# Patient Record
Sex: FEMALE | Race: BLACK OR AFRICAN AMERICAN | NOT HISPANIC OR LATINO | Employment: UNEMPLOYED | URBAN - METROPOLITAN AREA
[De-identification: names, ages, dates, MRNs, and addresses within clinical notes are randomized per-mention and may not be internally consistent; named-entity substitution may affect disease eponyms.]

---

## 2017-04-03 ENCOUNTER — NON-PROVIDER VISIT (OUTPATIENT)
Dept: OBGYN | Facility: CLINIC | Age: 32
End: 2017-04-03
Payer: MEDICAID

## 2017-04-03 DIAGNOSIS — Z32.01 PREGNANCY EXAMINATION OR TEST, POSITIVE RESULT: ICD-10-CM

## 2017-04-03 LAB
INT CON NEG: NEGATIVE
INT CON POS: POSITIVE
POC URINE PREGNANCY TEST: POSITIVE

## 2017-04-03 PROCEDURE — 81025 URINE PREGNANCY TEST: CPT | Performed by: OBSTETRICS & GYNECOLOGY

## 2017-05-30 ENCOUNTER — INITIAL PRENATAL (OUTPATIENT)
Dept: OBGYN | Facility: CLINIC | Age: 32
End: 2017-05-30
Payer: MEDICAID

## 2017-05-30 ENCOUNTER — HOSPITAL ENCOUNTER (OUTPATIENT)
Facility: MEDICAL CENTER | Age: 32
End: 2017-05-30
Attending: NURSE PRACTITIONER
Payer: MEDICAID

## 2017-05-30 VITALS
SYSTOLIC BLOOD PRESSURE: 118 MMHG | HEIGHT: 67 IN | BODY MASS INDEX: 43.63 KG/M2 | WEIGHT: 278 LBS | DIASTOLIC BLOOD PRESSURE: 62 MMHG

## 2017-05-30 DIAGNOSIS — Z34.82 ENCOUNTER FOR SUPERVISION OF OTHER NORMAL PREGNANCY IN SECOND TRIMESTER: ICD-10-CM

## 2017-05-30 DIAGNOSIS — Z34.82 ENCOUNTER FOR SUPERVISION OF OTHER NORMAL PREGNANCY, SECOND TRIMESTER: ICD-10-CM

## 2017-05-30 DIAGNOSIS — Z34.82 ENCOUNTER FOR SUPERVISION OF OTHER NORMAL PREGNANCY IN SECOND TRIMESTER: Primary | ICD-10-CM

## 2017-05-30 LAB
APPEARANCE UR: NORMAL
BILIRUB UR STRIP-MCNC: NORMAL MG/DL
COLOR UR AUTO: NORMAL
GLUCOSE UR STRIP.AUTO-MCNC: NEGATIVE MG/DL
KETONES UR STRIP.AUTO-MCNC: NEGATIVE MG/DL
LEUKOCYTE ESTERASE UR QL STRIP.AUTO: NEGATIVE
NITRITE UR QL STRIP.AUTO: NEGATIVE
PH UR STRIP.AUTO: 7.5 [PH] (ref 5–8)
PROT UR QL STRIP: NORMAL MG/DL
RBC UR QL AUTO: NEGATIVE
SP GR UR STRIP.AUTO: 1.01
UROBILINOGEN UR STRIP-MCNC: NORMAL MG/DL

## 2017-05-30 PROCEDURE — 81002 URINALYSIS NONAUTO W/O SCOPE: CPT | Performed by: NURSE PRACTITIONER

## 2017-05-30 PROCEDURE — 87591 N.GONORRHOEAE DNA AMP PROB: CPT

## 2017-05-30 PROCEDURE — 87491 CHLMYD TRACH DNA AMP PROBE: CPT

## 2017-05-30 PROCEDURE — 59401 PR NEW OB VISIT: CPT | Performed by: NURSE PRACTITIONER

## 2017-05-30 NOTE — PATIENT INSTRUCTIONS
P:  1.  GC/CT done. Pap deferred as wnl in last 2yrs.         2.  Prenatal labs, including AFP ordered - lab slip given        3.  Discussed PNV, diet, and adequate water intake        4.  NOB packet given        5.  Return to office in 4 wks        6.  Complete OB US in 3 wks

## 2017-05-30 NOTE — PROGRESS NOTES
"S:  Say Merrill is a 32 y.o.   @ EGA: 17w4d ANANT: Estimated Date of Delivery: 11/3/17  per [facility] who presents for her new OB exam.  She has no complaints.  Desires AFP.  Declines CF.  Reports faint FM.  Denies VB, LOF, or cramping.  Denies dysuria, vaginal DC.  Pt is  and lives with  and children. Not presently working outside the home.  Pregnancy is unplanned but wanted.  Offered Centering Pregnancy, pt declines.    O:    Filed Vitals:    17 1035   BP: 118/62   Height: 1.702 m (5' 7\")   Weight: 126.1 kg (278 lb)    See H&P Prenatal Physical.  Wet mount: deferred        FHTs: 142        Fundal ht: 17     A:   1.  IUP @ 17w4d ANANT: Estimated Date of Delivery: 11/3/17 per [facility]         2.  S=D        3.    Patient Active Problem List    Diagnosis Date Noted   • Encounter for supervision of other normal pregnancy, second trimester 2017         P:  1.  GC/CT done. Pap deferred as wnl in last 2yrs.         2.  Prenatal labs, including AFP ordered - lab slip given        3.  Discussed PNV, diet, and adequate water intake        4.  NOB packet given        5.  Return to office in 4 wks        6.  Complete OB US in 3 wks    "

## 2017-05-30 NOTE — MR AVS SNAPSHOT
"        Say Merrill   2017 10:30 AM   Initial Prenatal   MRN: [id number]    [facility]   Dept Phone:  [phone]    Description:  Female : 1985   Provider:  Di Austin C.N.M.; [facility] INTAKE           Allergies as of 2017     No Known Allergies      You were diagnosed with     Encounter for supervision of other normal pregnancy in second trimester   [[id number]]  -  Primary     Encounter for supervision of other normal pregnancy, second trimester   [[id number]]         Vital Signs     Blood Pressure Height Weight Body Mass Index Last Menstrual Period Breastfeeding?    118/62 mmHg 1.702 m (5' 7\") 126.1 kg (278 lb) 43.53 kg/m2 2017 (Exact Date) Unknown    Smoking Status                   Never Smoker            Basic Information     Date Of Birth Sex Race Ethnicity Preferred Language    1985 Female Black or  Non- English      Your appointments     2017 11:15 AM   OB Follow Up with Di Austin C.N.M.   [facility] [address] [address]   [phone]              Problem List              ICD-10-CM Priority Class Noted - Resolved    Encounter for supervision of other normal pregnancy, second trimester Z34.82   2017 - Present      Health Maintenance        Date Due Completion Dates    PAP SMEAR 2018, 2011    IMM DTaP/Tdap/Td Vaccine (2 - Td) 2025            Results     POCT Urinalysis      Component Value Standard Range & Units    POC Color  Negative    POC Appearance  Negative    POC Leukocyte Esterase negative Negative    POC Nitrites negative Negative    POC Urobiligen  Negative (0.2) mg/dL    POC Protein trace Negative mg/dL    POC Urine PH 7.5 5.0 - 8.0    POC Blood negative Negative    POC Specific Gravity 1.010 <1.005 - >1.030    POC Ketones negative Negative mg/dL    POC Biliruben  Negative mg/dL    POC Glucose negative Negative mg/dL         "                  Current Immunizations     Influenza Vaccine Quad Inj (Preserved) 12/24/2015 12:53 PM    Tdap Vaccine 12/24/2015 12:52 PM      Below and/or attached are the medications your provider expects you to take. Review all of your home medications and newly ordered medications with your provider and/or pharmacist. Follow medication instructions as directed by your provider and/or pharmacist. Please keep your medication list with you and share with your provider. Update the information when medications are discontinued, doses are changed, or new medications (including over-the-counter products) are added; and carry medication information at all times in the event of emergency situations     Allergies:  No Known Allergies          Medications  Valid as of: May 30, 2017 - 11:04 AM    Generic Name Brand Name Tablet Size Instructions for use    Prenatal MV-Min-Fe Fum-FA-DHA   Take  by mouth every day.        .                 Medicines prescribed today were sent to:     [facility]/PHARMACY #[id number] - [place], [place] - [address] [facility] AT [address] [address]    Phone: [phone] Fax: [phone]    Open 24 Hours?: No      Medication refill instructions:       If your prescription bottle indicates you have medication refills left, it is not necessary to call your provider’s office. Please contact your pharmacy and they will refill your medication.    If your prescription bottle indicates you do not have any refills left, you may request refills at any time through one of the following ways: The online [employer] system (except Urgent Care), by calling your provider’s office, or by asking your pharmacy to contact your provider’s office with a refill request. Medication refills are processed only during regular business hours and may not be available until the next business day. Your provider may request additional information or to have a follow-up visit with you prior to refilling  your medication.   *Please Note: Medication refills are assigned a new Rx number when refilled electronically. Your pharmacy may indicate that no refills were authorized even though a new prescription for the same medication is available at the pharmacy. Please request the medicine by name with the pharmacy before contacting your provider for a refill.        Your To Do List     Future Labs/Procedures Complete By Expires    AFP TETRA  As directed 5/31/2018    Chlamydia/GC PCR Urine or Swab  As directed 5/30/2018    PREG CNTR PRENATAL PN  As directed 5/31/2018    US-OB 2ND 3RD TRI COMPLETE  As directed 11/30/2017      Instructions    P:  1.  GC/CT done. Pap deferred as wnl in last 2yrs.         2.  Prenatal labs, including AFP ordered - lab slip given        3.  Discussed PNV, diet, and adequate water intake        4.  NOB packet given        5.  Return to office in 4 wks        6.  Complete OB US in 3 wks          MyChart Access Code: Activation code not generated  Current [employer] Status: Active

## 2017-05-30 NOTE — PROGRESS NOTES
NOB today. Sure about LMP  On PNV  Last pap 5/2015=negative  Phone # [phone]  Pharmacy confirmed  No problems today  Desires AFP

## 2017-05-31 ENCOUNTER — HOSPITAL ENCOUNTER (OUTPATIENT)
Dept: LAB | Facility: MEDICAL CENTER | Age: 32
End: 2017-05-31
Attending: NURSE PRACTITIONER
Payer: MEDICAID

## 2017-05-31 DIAGNOSIS — Z34.82 ENCOUNTER FOR SUPERVISION OF OTHER NORMAL PREGNANCY IN SECOND TRIMESTER: ICD-10-CM

## 2017-05-31 LAB
ABO GROUP BLD: NORMAL
APPEARANCE UR: CLEAR
BASOPHILS # BLD AUTO: 0.6 % (ref 0–1.8)
BASOPHILS # BLD: 0.06 K/UL (ref 0–0.12)
BILIRUB UR QL STRIP.AUTO: NEGATIVE
BLD GP AB SCN SERPL QL: NORMAL
C TRACH DNA SPEC QL NAA+PROBE: NEGATIVE
COLOR UR: YELLOW
CULTURE IF INDICATED INDCX: NO UA CULTURE
EOSINOPHIL # BLD AUTO: 0.13 K/UL (ref 0–0.51)
EOSINOPHIL NFR BLD: 1.2 % (ref 0–6.9)
ERYTHROCYTE [DISTWIDTH] IN BLOOD BY AUTOMATED COUNT: 42.2 FL (ref 35.9–50)
GLUCOSE UR STRIP.AUTO-MCNC: NEGATIVE MG/DL
HBV SURFACE AG SER QL: NEGATIVE
HCT VFR BLD AUTO: 36.6 % (ref 37–47)
HGB BLD-MCNC: 11.6 G/DL (ref 12–16)
HIV 1+2 AB+HIV1 P24 AG SERPL QL IA: NON REACTIVE
IMM GRANULOCYTES # BLD AUTO: 0.04 K/UL (ref 0–0.11)
IMM GRANULOCYTES NFR BLD AUTO: 0.4 % (ref 0–0.9)
KETONES UR STRIP.AUTO-MCNC: NEGATIVE MG/DL
LEUKOCYTE ESTERASE UR QL STRIP.AUTO: NEGATIVE
LYMPHOCYTES # BLD AUTO: 1.5 K/UL (ref 1–4.8)
LYMPHOCYTES NFR BLD: 14.3 % (ref 22–41)
MCH RBC QN AUTO: 26.3 PG (ref 27–33)
MCHC RBC AUTO-ENTMCNC: 31.7 G/DL (ref 33.6–35)
MCV RBC AUTO: 83 FL (ref 81.4–97.8)
MICRO URNS: ABNORMAL
MONOCYTES # BLD AUTO: 0.56 K/UL (ref 0–0.85)
MONOCYTES NFR BLD AUTO: 5.3 % (ref 0–13.4)
N GONORRHOEA DNA SPEC QL NAA+PROBE: NEGATIVE
NEUTROPHILS # BLD AUTO: 8.18 K/UL (ref 2–7.15)
NEUTROPHILS NFR BLD: 78.2 % (ref 44–72)
NITRITE UR QL STRIP.AUTO: NEGATIVE
NRBC # BLD AUTO: 0 K/UL
NRBC BLD AUTO-RTO: 0 /100 WBC
PH UR STRIP.AUTO: 6.5 [PH]
PLATELET # BLD AUTO: 281 K/UL (ref 164–446)
PMV BLD AUTO: 9.5 FL (ref 9–12.9)
PROT UR QL STRIP: NEGATIVE MG/DL
RBC # BLD AUTO: 4.41 M/UL (ref 4.2–5.4)
RBC UR QL AUTO: NEGATIVE
RH BLD: NORMAL
RUBV AB SER QL: 77.3 IU/ML
SP GR UR STRIP.AUTO: 1.02
SPECIMEN SOURCE: NORMAL
TREPONEMA PALLIDUM IGG+IGM AB [PRESENCE] IN SERUM OR PLASMA BY IMMUNOASSAY: NON REACTIVE
WBC # BLD AUTO: 10.5 K/UL (ref 4.8–10.8)

## 2017-05-31 PROCEDURE — 87389 HIV-1 AG W/HIV-1&-2 AB AG IA: CPT

## 2017-05-31 PROCEDURE — 86762 RUBELLA ANTIBODY: CPT

## 2017-05-31 PROCEDURE — 36415 COLL VENOUS BLD VENIPUNCTURE: CPT

## 2017-05-31 PROCEDURE — 87340 HEPATITIS B SURFACE AG IA: CPT

## 2017-05-31 PROCEDURE — 86901 BLOOD TYPING SEROLOGIC RH(D): CPT

## 2017-05-31 PROCEDURE — 81003 URINALYSIS AUTO W/O SCOPE: CPT

## 2017-05-31 PROCEDURE — 86900 BLOOD TYPING SEROLOGIC ABO: CPT

## 2017-05-31 PROCEDURE — 85025 COMPLETE CBC W/AUTO DIFF WBC: CPT

## 2017-05-31 PROCEDURE — 86780 TREPONEMA PALLIDUM: CPT

## 2017-05-31 PROCEDURE — 86850 RBC ANTIBODY SCREEN: CPT

## 2017-05-31 PROCEDURE — 81511 FTL CGEN ABNOR FOUR ANAL: CPT

## 2017-06-03 LAB
# FETUSES US: NORMAL
AFP MOM SERPL: 1.03
AFP SERPL-MCNC: 31 NG/ML
AGE - REPORTED: 31.5 YR
GA METHOD: NORMAL
GA: 17.71 WEEKS
HCG MOM SERPL: 0.47
HCG SERPL-ACNC: 8997 IU/L
IDDM PATIENT QL: NO
INHIBIN A MOM SERPL: 0.92
INHIBIN A SERPL-MCNC: 97 PG/ML
INTEGRATED SCN PATIENT-IMP: NORMAL
PATHOLOGY STUDY: NORMAL
U ESTRIOL MOM SERPL: 1.03
U ESTRIOL SERPL-MCNC: 1.08 NG/ML

## 2017-06-20 ENCOUNTER — APPOINTMENT (OUTPATIENT)
Dept: RADIOLOGY | Facility: IMAGING CENTER | Age: 32
End: 2017-06-20
Attending: NURSE PRACTITIONER
Payer: MEDICAID

## 2017-06-20 DIAGNOSIS — Z34.82 ENCOUNTER FOR SUPERVISION OF OTHER NORMAL PREGNANCY IN SECOND TRIMESTER: ICD-10-CM

## 2017-06-20 PROCEDURE — 76805 OB US >/= 14 WKS SNGL FETUS: CPT | Performed by: OBSTETRICS & GYNECOLOGY

## 2017-06-21 ENCOUNTER — DATING (OUTPATIENT)
Dept: OBGYN | Facility: CLINIC | Age: 32
End: 2017-06-21

## 2017-06-21 ENCOUNTER — ROUTINE PRENATAL (OUTPATIENT)
Dept: OBGYN | Facility: CLINIC | Age: 32
End: 2017-06-21
Payer: MEDICAID

## 2017-06-21 VITALS — WEIGHT: 281 LBS | DIASTOLIC BLOOD PRESSURE: 60 MMHG | SYSTOLIC BLOOD PRESSURE: 118 MMHG | BODY MASS INDEX: 44 KG/M2

## 2017-06-21 DIAGNOSIS — Z34.82 ENCOUNTER FOR SUPERVISION OF OTHER NORMAL PREGNANCY, SECOND TRIMESTER: Primary | ICD-10-CM

## 2017-06-21 PROCEDURE — 90040 PR PRENATAL FOLLOW UP: CPT | Performed by: NURSE PRACTITIONER

## 2017-06-21 NOTE — PROGRESS NOTES
Pt here today for OB follow up  Reports +FM  WT: 281 lb  BP: 118/60  Pt states she had her US done yesterday  Pt states no complaints today.  Good # [phone]

## 2017-06-21 NOTE — PATIENT INSTRUCTIONS
P:  1.  Reviewed labs, ultrasound w pt.          2.  Questions answered.          3.  Encouraged adequate water intake        4.  F/u 4 wks.        5.  FYI: none.

## 2017-06-21 NOTE — MR AVS SNAPSHOT
Say Merrill   2017 11:15 AM   Routine Prenatal   MRN: [id number]    [facility]   Dept Phone:  [phone]    Description:  Female : 1985   Provider:  Di Austin C.N.M.           Allergies as of 2017     No Known Allergies      You were diagnosed with     Encounter for supervision of other normal pregnancy, second trimester   [[id number]]  -  Primary       Vital Signs     Blood Pressure Weight Last Menstrual Period Smoking Status          118/60 mmHg 127.461 kg (281 lb) 2017 (Exact Date) Never Smoker         Basic Information     Date Of Birth Sex Race Ethnicity Preferred Language    1985 Female Black or  Non- English      Problem List              ICD-10-CM Priority Class Noted - Resolved    Encounter for supervision of other normal pregnancy, second trimester Z34.82   2017 - Present      Health Maintenance        Date Due Completion Dates    PAP SMEAR 2018, 2011    IMM DTaP/Tdap/Td Vaccine (2 - Td) 2025            Current Immunizations     Influenza Vaccine Quad Inj (Preserved) 2015 12:53 PM    Tdap Vaccine 2015 12:52 PM      Below and/or attached are the medications your provider expects you to take. Review all of your home medications and newly ordered medications with your provider and/or pharmacist. Follow medication instructions as directed by your provider and/or pharmacist. Please keep your medication list with you and share with your provider. Update the information when medications are discontinued, doses are changed, or new medications (including over-the-counter products) are added; and carry medication information at all times in the event of emergency situations     Allergies:  No Known Allergies          Medications  Valid as of: 2017 - 12:03 PM    Generic Name Brand Name Tablet Size Instructions for use    Prenatal MV-Min-Fe Fum-FA-DHA   Take  by  mouth every day.        .                 Medicines prescribed today were sent to:     [facility]/PHARMAC[facility] #[id number] - [place] [place] [facility] [address] [place] [address]    Phone: [phone] Fax: [phone]    Open 24 Hours?: No      Medication refill instructions:       If your prescription bottle indicates you have medication refills left, it is not necessary to call your provider’s office. Please contact your pharmacy and they will refill your medication.    If your prescription bottle indicates you do not have any refills left, you may request refills at any time through one of the following ways: The online [employer] system (except Urgent Care), by calling your provider’s office, or by asking your pharmacy to contact your provider’s office with a refill request. Medication refills are processed only during regular business hours and may not be available until the next business day. Your provider may request additional information or to have a follow-up visit with you prior to refilling your medication.   *Please Note: Medication refills are assigned a new Rx number when refilled electronically. Your pharmacy may indicate that no refills were authorized even though a new prescription for the same medication is available at the pharmacy. Please request the medicine by name with the pharmacy before contacting your provider for a refill.        Instructions    P:  1.  Reviewed labs, ultrasound w pt.          2.  Questions answered.          3.  Encouraged adequate water intake        4.  F/u 4 wks.        5.  FYI: none.          Other Notes About Your Plan     Baby Girl           MyChart Access Code: Activation code not generated  Current [employer] Status: Active

## 2017-06-21 NOTE — PROGRESS NOTES
S:  Pt is  at 20w5d here for routine OB follow up.  No c/o.  Reports good FM.  Denies VB, LOF, RUCs, or vaginal DC.     O:  Please see above vitals.        FHTs: 147        Fundal ht: 20 cm.        AFP: wnl -- reviewed w pt.    Complete OB US     2017 8:57 AM    HISTORY/REASON FOR EXAM:  Evaluate fetal anatomy, alpha-fetoprotein within normal limits    TECHNIQUE/EXAM DESCRIPTION: OB complete ultrasound.    COMPARISON:  None    FINDINGS:  Fetal Lie:  Vertex  LMP:  2017  Clinical ANANT by LMP:  11/3/2017    Placenta (Location):  Anterior  Placenta Previa: No  Placental Grade: I    Amniotic Fluid Volume:  BRAYAN = 11.88 cm    Fetal Heart Rate:  129 bpm    Cervical Length:  3.48 cm transabdominal    No maternal adnexal mass is identified.    Umbilical Artery S/D Ratio(s):  Not applicable    Fetal Anatomy  (Seen or Not Seen)  Lateral Ventricles     Seen  Cisterna Magna        Seen  Cerebellum              Seen  CSP             Seen  Orbits             Seen  Face/Lips                Seen  Cord Insertion         Seen  Placental CI         Seen  4 Chamber Heart     Seen  LVOT               Seen  RVOT              Seen  Stomach       Seen  Kidneys                   Seen  Urinary Bladder      Seen  Spine                       Seen  3 Vessel Cord          Seen  Both Upper Extremities    Seen  Both Lower Extremities    Seen  Diaphragm             Seen  Movement       Seen  Gender:  Likely female    Fetal Biometry  BPD    4.39 cm, 19 weeks, 2 days  HC    17.29 cm, 19 weeks, 6 days  AC    15.34 cm, 20 weeks, 4 days  Femur Length    3.29 cm, 20 weeks, 2 days  Humerus Length    3.16 cm, 20 weeks, 4 days  Cerebellum Diameter   1.91 cm    EGA by this US:  20 weeks, 1 day  ANANT by this US: 2017  ANANT by 1st US:  Not applicable    Estimated Fetal Weight:  346 grams    Comments:         Impression        1.  Single intrauterine pregnancy of an estimated gestational age of 20 weeks, 1 day with an estimated date of delivery  of 11/6/2017.  2.  Fetal survey within normal limits.       A:  IUP 20w5d  Patient Active Problem List    Diagnosis Date Noted   • Encounter for supervision of other normal pregnancy, second trimester 05/30/2017        P:  1.  Reviewed labs, ultrasound w pt.          2.  Questions answered.          3.  Encouraged adequate water intake        4.  F/u 4 wks.        5.  FYI: none.

## 2017-07-17 ENCOUNTER — ROUTINE PRENATAL (OUTPATIENT)
Dept: OBGYN | Facility: MEDICAL CENTER | Age: 32
End: 2017-07-17
Payer: COMMERCIAL

## 2017-07-17 VITALS — WEIGHT: 284 LBS | SYSTOLIC BLOOD PRESSURE: 120 MMHG | DIASTOLIC BLOOD PRESSURE: 70 MMHG | BODY MASS INDEX: 44.47 KG/M2

## 2017-07-17 DIAGNOSIS — Z34.82 ENCOUNTER FOR SUPERVISION OF OTHER NORMAL PREGNANCY, SECOND TRIMESTER: ICD-10-CM

## 2017-07-17 PROCEDURE — 90040 PR PRENATAL FOLLOW UP: CPT | Performed by: OBSTETRICS & GYNECOLOGY

## 2017-07-17 NOTE — MR AVS SNAPSHOT
Say Ron Garfield   2017 3:30 PM   Routine Prenatal   MRN: [id number]    Department:  [facility]   Dept Phone:  [phone]    Description:  Female : 1985   Provider:  Maria Dolores Rueda M.D.           Allergies as of 2017     No Known Allergies      You were diagnosed with     Encounter for supervision of other normal pregnancy, second trimester   [[id number]]         Vital Signs     Blood Pressure Weight Last Menstrual Period Smoking Status          120/70 mmHg 128.822 kg (284 lb) 2017 (Exact Date) Never Smoker         Basic Information     Date Of Birth Sex Race Ethnicity Preferred Language    1985 Female Black or  Non- English      Your appointments     Aug 17, 2017  8:00 AM   OB Follow Up with Maria Dolores Rueda M.D.   [facility]    [address]  [place] [address]   [phone]            Sep 20, 2017 11:15 AM   OB Follow Up with Maria Dolores Rueda M.D.   [facility]    [address]  [place] [address]   [phone]            Oct 03, 2017 10:00 AM   OB Follow Up with Maria Dolores Rueda M.D.   [facility]    [address]  [place] [address]   [phone]            Oct 18, 2017 10:00 AM   OB Follow Up with Maria Dolores Rueda M.D.   [facility]    [address]  [place] [address]   [phone]            Oct 24, 2017 10:00 AM   OB Follow Up with Maria Dolores Rueda M.D.   [facility]    [address]  [place] [address]   [phone]              Problem List              ICD-10-CM Priority Class Noted - Resolved    Encounter for supervision of other normal pregnancy, second trimester Z34.82   2017 - Present      Health Maintenance        Date Due Completion Dates    IMM  INFLUENZA (1) 9/1/2017 12/24/2015    PAP SMEAR 6/26/2018 6/26/2015, 5/24/2011    IMM DTaP/Tdap/Td Vaccine (2 - Td) 12/24/2025 12/24/2015            Current Immunizations     Influenza Vaccine Quad Inj (Preserved) 12/24/2015 12:53 PM    Tdap Vaccine 12/24/2015 12:52 PM      Below and/or attached are the medications your provider expects you to take. Review all of your home medications and newly ordered medications with your provider and/or pharmacist. Follow medication instructions as directed by your provider and/or pharmacist. Please keep your medication list with you and share with your provider. Update the information when medications are discontinued, doses are changed, or new medications (including over-the-counter products) are added; and carry medication information at all times in the event of emergency situations     Allergies:  No Known Allergies          Medications  Valid as of: July 17, 2017 -  4:35 PM    Generic Name Brand Name Tablet Size Instructions for use    Prenatal MV-Min-Fe Fum-FA-DHA   Take  by mouth every day.        .                 Medicines prescribed today were sent to:     [facility]/PHARMACY #[id number] - [place], [place] - [address] [address]    Phone: [phone] Fax: [phone]    Open 24 Hours?: No      Medication refill instructions:       If your prescription bottle indicates you have medication refills left, it is not necessary to call your provider’s office. Please contact your pharmacy and they will refill your medication.    If your prescription bottle indicates you do not have any refills left, you may request refills at any time through one of the following ways: The online [employer] system (except Urgent Care), by calling your provider’s office, or by asking your pharmacy to contact your provider’s office with a refill request. Medication refills are processed only during regular business hours and may not be available until  the next business day. Your provider may request additional information or to have a follow-up visit with you prior to refilling your medication.   *Please Note: Medication refills are assigned a new Rx number when refilled electronically. Your pharmacy may indicate that no refills were authorized even though a new prescription for the same medication is available at the pharmacy. Please request the medicine by name with the pharmacy before contacting your provider for a refill.        Your To Do List     Future Labs/Procedures Complete By Expires    CBC WITHOUT DIFFERENTIAL  As directed 1/17/2018    GLUCOSE 1HR GESTATIONAL  As directed 7/18/2018    T.PALLIDUM AB EIA  As directed 7/18/2018      Instructions    PTl precautions       Other Notes About Your Plan     Baby Girl           MyChart Access Code: Activation code not generated  Current MyChart Status: Active

## 2017-07-17 NOTE — PROGRESS NOTES
Pt denies CTX, LOF, VB or any other c/o.  Good fetal movement.    Lab:No results found for this or any previous visit (from the past 672 hour(s)).    A/P:  32 y.o.  at 24w3d presents for obstetric follow-up.    1.  Continue prenatal vitamins.  2.  Begin/continue kick counts at 28 weeks   3.  Watch weight gain.  4.  Increase water intake.  5.  Follow-up in 4 weeks.  6.  PTL/labor precautions given  7.  GTT given

## 2017-07-17 NOTE — PROGRESS NOTES
Pt here today for OB follow up, good fetal movement, denies LOF, VB. Pt is a transfer of care from [facility]. Informed pt about 1 hr GTT.

## 2017-08-10 ENCOUNTER — HOSPITAL ENCOUNTER (OUTPATIENT)
Dept: LAB | Facility: MEDICAL CENTER | Age: 32
End: 2017-08-10
Attending: OBSTETRICS & GYNECOLOGY
Payer: COMMERCIAL

## 2017-08-10 DIAGNOSIS — Z34.82 ENCOUNTER FOR SUPERVISION OF OTHER NORMAL PREGNANCY, SECOND TRIMESTER: ICD-10-CM

## 2017-08-10 LAB
ERYTHROCYTE [DISTWIDTH] IN BLOOD BY AUTOMATED COUNT: 43.3 FL (ref 35.9–50)
GLUCOSE 1H P 50 G GLC PO SERPL-MCNC: 136 MG/DL (ref 70–139)
HCT VFR BLD AUTO: 35 % (ref 37–47)
HGB BLD-MCNC: 10.9 G/DL (ref 12–16)
MCH RBC QN AUTO: 26 PG (ref 27–33)
MCHC RBC AUTO-ENTMCNC: 31.1 G/DL (ref 33.6–35)
MCV RBC AUTO: 83.3 FL (ref 81.4–97.8)
PLATELET # BLD AUTO: 275 K/UL (ref 164–446)
PMV BLD AUTO: 9.5 FL (ref 9–12.9)
RBC # BLD AUTO: 4.2 M/UL (ref 4.2–5.4)
TREPONEMA PALLIDUM IGG+IGM AB [PRESENCE] IN SERUM OR PLASMA BY IMMUNOASSAY: NON REACTIVE
WBC # BLD AUTO: 12.1 K/UL (ref 4.8–10.8)

## 2017-08-10 PROCEDURE — 82950 GLUCOSE TEST: CPT

## 2017-08-10 PROCEDURE — 86780 TREPONEMA PALLIDUM: CPT

## 2017-08-10 PROCEDURE — 85027 COMPLETE CBC AUTOMATED: CPT

## 2017-08-10 PROCEDURE — 36415 COLL VENOUS BLD VENIPUNCTURE: CPT

## 2017-09-06 ENCOUNTER — ROUTINE PRENATAL (OUTPATIENT)
Dept: OBGYN | Facility: MEDICAL CENTER | Age: 32
End: 2017-09-06
Payer: COMMERCIAL

## 2017-09-06 VITALS
HEIGHT: 67 IN | DIASTOLIC BLOOD PRESSURE: 74 MMHG | SYSTOLIC BLOOD PRESSURE: 128 MMHG | WEIGHT: 285.9 LBS | BODY MASS INDEX: 44.87 KG/M2

## 2017-09-06 DIAGNOSIS — O26.843 SIZE OF FETUS INCONSISTENT WITH DATES IN THIRD TRIMESTER: ICD-10-CM

## 2017-09-06 PROCEDURE — 99213 OFFICE O/P EST LOW 20 MIN: CPT | Performed by: OBSTETRICS & GYNECOLOGY

## 2017-09-06 NOTE — PROGRESS NOTES
Pt denies CTX, LOF, VB or any other c/o.  Good fetal movement.    Lab:  Recent Results (from the past 672 hour(s))   GLUCOSE 1HR GESTATIONAL    Collection Time: 08/10/17 12:57 PM   Result Value Ref Range    Glucose, Post Dose 136 70 - 139 mg/dL   CBC WITHOUT DIFFERENTIAL    Collection Time: 08/10/17 12:57 PM   Result Value Ref Range    WBC 12.1 (H) 4.8 - 10.8 K/uL    RBC 4.20 4.20 - 5.40 M/uL    Hemoglobin 10.9 (L) 12.0 - 16.0 g/dL    Hematocrit 35.0 (L) 37.0 - 47.0 %    MCV 83.3 81.4 - 97.8 fL    MCH 26.0 (L) 27.0 - 33.0 pg    MCHC 31.1 (L) 33.6 - 35.0 g/dL    RDW 43.3 35.9 - 50.0 fL    Platelet Count 275 164 - 446 K/uL    MPV 9.5 9.0 - 12.9 fL   T.PALLIDUM AB EIA    Collection Time: 08/10/17 12:57 PM   Result Value Ref Range    Syphilis, Treponemal Qual Non Reactive Non Reactive       A/P:  32 y.o.  at 31w5d presents for obstetric follow-up. Labs reviewed. Consider low carb diet. Doing well with weight.    1.  Continue prenatal vitamins.  2.  Begin/continue kick counts at 28 weeks   3.  Watch weight gain.  4.  Increase water intake.  5.  Follow-up in 2 weeks.  6.  PTL/labor precautions given  7.  Spent 15 mins face to face

## 2017-09-26 ENCOUNTER — ROUTINE PRENATAL (OUTPATIENT)
Dept: OBGYN | Facility: MEDICAL CENTER | Age: 32
End: 2017-09-26
Payer: COMMERCIAL

## 2017-09-26 VITALS — DIASTOLIC BLOOD PRESSURE: 70 MMHG | BODY MASS INDEX: 45.58 KG/M2 | SYSTOLIC BLOOD PRESSURE: 120 MMHG | WEIGHT: 291 LBS

## 2017-09-26 DIAGNOSIS — Z34.83 ENCOUNTER FOR SUPERVISION OF NORMAL PREGNANCY IN MULTIGRAVIDA IN THIRD TRIMESTER: ICD-10-CM

## 2017-09-26 DIAGNOSIS — Z34.82 ENCOUNTER FOR SUPERVISION OF OTHER NORMAL PREGNANCY, SECOND TRIMESTER: ICD-10-CM

## 2017-09-26 PROCEDURE — 90040 PR PRENATAL FOLLOW UP: CPT | Performed by: NURSE PRACTITIONER

## 2017-09-26 PROCEDURE — 90715 TDAP VACCINE 7 YRS/> IM: CPT | Performed by: NURSE PRACTITIONER

## 2017-09-26 PROCEDURE — 90471 IMMUNIZATION ADMIN: CPT | Performed by: NURSE PRACTITIONER

## 2017-09-26 NOTE — PROGRESS NOTES
S: Say Merrill at [id number] for OB  follow up visit.  Patient  Has no complaints of  Today   Fetal movement is +  Denies any loss of fluid, vaginal bleeding or contractions.    O: VSS  Urine dip NE  See above values  Cervical exam NE    A: multip 34w4d      P:   3rdTrimester Guidance and comfort measures, diet and exercise review.  Labs:  None due   Fetal Kick Count continue   RTC in 2 weeks   U/S to confirm position of vtx

## 2017-10-03 ENCOUNTER — HOSPITAL ENCOUNTER (OUTPATIENT)
Facility: MEDICAL CENTER | Age: 32
End: 2017-10-03
Attending: OBSTETRICS & GYNECOLOGY
Payer: COMMERCIAL

## 2017-10-03 ENCOUNTER — ROUTINE PRENATAL (OUTPATIENT)
Dept: OBGYN | Facility: MEDICAL CENTER | Age: 32
End: 2017-10-03
Payer: COMMERCIAL

## 2017-10-03 VITALS
BODY MASS INDEX: 45.8 KG/M2 | WEIGHT: 291.8 LBS | DIASTOLIC BLOOD PRESSURE: 70 MMHG | SYSTOLIC BLOOD PRESSURE: 120 MMHG | HEIGHT: 67 IN

## 2017-10-03 DIAGNOSIS — Z34.82 ENCOUNTER FOR SUPERVISION OF OTHER NORMAL PREGNANCY, SECOND TRIMESTER: ICD-10-CM

## 2017-10-03 DIAGNOSIS — Z34.80 SUPERVISION OF OTHER NORMAL PREGNANCY, ANTEPARTUM: ICD-10-CM

## 2017-10-03 PROCEDURE — 87653 STREP B DNA AMP PROBE: CPT

## 2017-10-03 PROCEDURE — 90040 PR PRENATAL FOLLOW UP: CPT | Performed by: OBSTETRICS & GYNECOLOGY

## 2017-10-03 NOTE — PROGRESS NOTES
Pt denies CTX, LOF, VB or any other c/o.  Good fetal movement.    Lab:No results found for this or any previous visit (from the past 672 hour(s)).    A/P:  32 y.o.  at 35w4d presents for obstetric follow-up.    1.  Continue prenatal vitamins.  2.  Begin/continue kick counts at 28 weeks   3.  Watch weight gain.  4.  Increase water intake.  5.  Follow-up in 1 weeks.  6.  PTL/labor precautions given

## 2017-10-04 LAB — GP B STREP DNA SPEC QL NAA+PROBE: POSITIVE

## 2017-10-11 ENCOUNTER — ROUTINE PRENATAL (OUTPATIENT)
Dept: OBGYN | Facility: MEDICAL CENTER | Age: 32
End: 2017-10-11
Payer: COMMERCIAL

## 2017-10-11 VITALS
BODY MASS INDEX: 45.84 KG/M2 | SYSTOLIC BLOOD PRESSURE: 124 MMHG | DIASTOLIC BLOOD PRESSURE: 76 MMHG | HEIGHT: 67 IN | WEIGHT: 292.1 LBS

## 2017-10-11 DIAGNOSIS — Z34.82 ENCOUNTER FOR SUPERVISION OF OTHER NORMAL PREGNANCY, SECOND TRIMESTER: ICD-10-CM

## 2017-10-11 PROBLEM — Z34.93 SUPERVISION OF NORMAL PREGNANCY IN THIRD TRIMESTER: Status: ACTIVE | Noted: 2017-05-30

## 2017-10-11 PROCEDURE — 90040 PR PRENATAL FOLLOW UP: CPT | Performed by: NURSE PRACTITIONER

## 2017-10-11 NOTE — PROGRESS NOTES
S: Say Merrill at [id number] for OB  follow up visit.  Patient has complaints of  pressure  Fetal movement is +  Denies any loss of fluid, vaginal bleeding or contractions.    O: VSS  Urine dip NE  See above values  Cervical exam FT/10/soft floating will check position with U/S as I cannot feel the presenting part, vertex but oblique to the left side    A: multip 36w5d  + GBS     P:   3rd  Trimester Guidance and comfort measures, diet and exercise review.  Labs:  None due  Fetal Kick Count  continue  RTC in 1 week  Declines Flu vaccine   Discussed positions for comfort and fetal positioning

## 2017-10-11 NOTE — PROGRESS NOTES
Pt here today for ob follow up, good fetal movement, denies LOF,VB.  Pt states no issues ir concerns  Pt declined flu shot

## 2017-10-18 ENCOUNTER — ROUTINE PRENATAL (OUTPATIENT)
Dept: OBGYN | Facility: MEDICAL CENTER | Age: 32
End: 2017-10-18
Payer: COMMERCIAL

## 2017-10-18 VITALS — BODY MASS INDEX: 46.05 KG/M2 | WEIGHT: 293 LBS | SYSTOLIC BLOOD PRESSURE: 120 MMHG | DIASTOLIC BLOOD PRESSURE: 76 MMHG

## 2017-10-18 DIAGNOSIS — B95.1 POSITIVE GBS TEST: ICD-10-CM

## 2017-10-18 DIAGNOSIS — Z34.83 ENCOUNTER FOR SUPERVISION OF OTHER NORMAL PREGNANCY IN THIRD TRIMESTER: ICD-10-CM

## 2017-10-18 PROCEDURE — 90040 PR PRENATAL FOLLOW UP: CPT | Performed by: OBSTETRICS & GYNECOLOGY

## 2017-10-18 NOTE — PROGRESS NOTES
Pt denies CTX, LOF, VB or any other c/o.  Good fetal movement.    Lab:  Recent Results (from the past 672 hour(s))   GRP B STREP, BY PCR (WAGGONER BROTH)    Collection Time: 10/03/17 10:43 AM   Result Value Ref Range    Strep Gp B DNA PCR POSITIVE (A) Negative       A/P:  32 y.o.  at 37w5d presents for obstetric follow-up.    1.  Continue prenatal vitamins.  2.  Begin/continue kick counts at 28 weeks   3.  Watch weight gain.  4.  Increase water intake.  5.  Follow-up in 1 weeks.  6.  PTL/labor precautions given  7.  IOL scheduled

## 2017-10-24 ENCOUNTER — ROUTINE PRENATAL (OUTPATIENT)
Dept: OBGYN | Facility: MEDICAL CENTER | Age: 32
End: 2017-10-24
Payer: COMMERCIAL

## 2017-10-24 VITALS — BODY MASS INDEX: 46.36 KG/M2 | DIASTOLIC BLOOD PRESSURE: 80 MMHG | SYSTOLIC BLOOD PRESSURE: 120 MMHG | WEIGHT: 293 LBS

## 2017-10-24 DIAGNOSIS — Z34.83 ENCOUNTER FOR SUPERVISION OF OTHER NORMAL PREGNANCY IN THIRD TRIMESTER: ICD-10-CM

## 2017-10-24 PROCEDURE — 90040 PR PRENATAL FOLLOW UP: CPT | Performed by: OBSTETRICS & GYNECOLOGY

## 2017-10-24 NOTE — PROGRESS NOTES
Pt denies CTX, LOF, VB or any other c/o.  Good fetal movement.    Lab:  Recent Results (from the past 672 hour(s))   GRP B STREP, BY PCR (WAGGONER BROTH)    Collection Time: 10/03/17 10:43 AM   Result Value Ref Range    Strep Gp B DNA PCR POSITIVE (A) Negative       A/P:  32 y.o.  at 38w4d presents for obstetric follow-up.    1.  Continue prenatal vitamins.  2.  Begin/continue kick counts at 28 weeks   3.  Watch weight gain.  4.  Increase water intake.  5.  IOL on 10/29 at 8pm.  6.  PTL/labor precautions given

## 2017-10-29 ENCOUNTER — HOSPITAL ENCOUNTER (INPATIENT)
Facility: MEDICAL CENTER | Age: 32
LOS: 3 days | End: 2017-11-01
Attending: OBSTETRICS & GYNECOLOGY | Admitting: OBSTETRICS & GYNECOLOGY
Payer: COMMERCIAL

## 2017-10-29 LAB
BASOPHILS # BLD AUTO: 0.3 % (ref 0–1.8)
BASOPHILS # BLD: 0.03 K/UL (ref 0–0.12)
EOSINOPHIL # BLD AUTO: 0.1 K/UL (ref 0–0.51)
EOSINOPHIL NFR BLD: 0.9 % (ref 0–6.9)
ERYTHROCYTE [DISTWIDTH] IN BLOOD BY AUTOMATED COUNT: 45.5 FL (ref 35.9–50)
HCT VFR BLD AUTO: 33.5 % (ref 37–47)
HGB BLD-MCNC: 10.4 G/DL (ref 12–16)
HOLDING TUBE BB 8507: NORMAL
IMM GRANULOCYTES # BLD AUTO: 0.03 K/UL (ref 0–0.11)
IMM GRANULOCYTES NFR BLD AUTO: 0.3 % (ref 0–0.9)
LYMPHOCYTES # BLD AUTO: 1.81 K/UL (ref 1–4.8)
LYMPHOCYTES NFR BLD: 16.6 % (ref 22–41)
MCH RBC QN AUTO: 24.4 PG (ref 27–33)
MCHC RBC AUTO-ENTMCNC: 31 G/DL (ref 33.6–35)
MCV RBC AUTO: 78.6 FL (ref 81.4–97.8)
MONOCYTES # BLD AUTO: 1.01 K/UL (ref 0–0.85)
MONOCYTES NFR BLD AUTO: 9.3 % (ref 0–13.4)
NEUTROPHILS # BLD AUTO: 7.93 K/UL (ref 2–7.15)
NEUTROPHILS NFR BLD: 72.6 % (ref 44–72)
NRBC # BLD AUTO: 0 K/UL
NRBC BLD AUTO-RTO: 0 /100 WBC
PLATELET # BLD AUTO: 249 K/UL (ref 164–446)
PMV BLD AUTO: 9.6 FL (ref 9–12.9)
RBC # BLD AUTO: 4.26 M/UL (ref 4.2–5.4)
WBC # BLD AUTO: 10.9 K/UL (ref 4.8–10.8)

## 2017-10-29 PROCEDURE — 770002 HCHG ROOM/CARE - OB PRIVATE (112)

## 2017-10-29 PROCEDURE — 85025 COMPLETE CBC W/AUTO DIFF WBC: CPT

## 2017-10-29 PROCEDURE — 700111 HCHG RX REV CODE 636 W/ 250 OVERRIDE (IP): Performed by: OBSTETRICS & GYNECOLOGY

## 2017-10-29 PROCEDURE — 700102 HCHG RX REV CODE 250 W/ 637 OVERRIDE(OP): Performed by: OBSTETRICS & GYNECOLOGY

## 2017-10-29 PROCEDURE — A9270 NON-COVERED ITEM OR SERVICE: HCPCS | Performed by: OBSTETRICS & GYNECOLOGY

## 2017-10-29 PROCEDURE — 700105 HCHG RX REV CODE 258

## 2017-10-29 RX ORDER — PENICILLIN G POTASSIUM 5000000 [IU]/1
5 INJECTION, POWDER, FOR SOLUTION INTRAMUSCULAR; INTRAVENOUS ONCE
Status: COMPLETED | OUTPATIENT
Start: 2017-10-29 | End: 2017-10-29

## 2017-10-29 RX ORDER — SODIUM CHLORIDE, SODIUM LACTATE, POTASSIUM CHLORIDE, CALCIUM CHLORIDE 600; 310; 30; 20 MG/100ML; MG/100ML; MG/100ML; MG/100ML
INJECTION, SOLUTION INTRAVENOUS
Status: COMPLETED
Start: 2017-10-29 | End: 2017-10-29

## 2017-10-29 RX ORDER — HYDROXYZINE 50 MG/1
50 TABLET, FILM COATED ORAL EVERY 6 HOURS PRN
Status: DISCONTINUED | OUTPATIENT
Start: 2017-10-29 | End: 2017-10-30 | Stop reason: HOSPADM

## 2017-10-29 RX ORDER — SODIUM CHLORIDE, SODIUM LACTATE, POTASSIUM CHLORIDE, CALCIUM CHLORIDE 600; 310; 30; 20 MG/100ML; MG/100ML; MG/100ML; MG/100ML
INJECTION, SOLUTION INTRAVENOUS CONTINUOUS
Status: DISPENSED | OUTPATIENT
Start: 2017-10-29 | End: 2017-10-30

## 2017-10-29 RX ORDER — ALUMINA, MAGNESIA, AND SIMETHICONE 2400; 2400; 240 MG/30ML; MG/30ML; MG/30ML
30 SUSPENSION ORAL EVERY 6 HOURS PRN
Status: DISCONTINUED | OUTPATIENT
Start: 2017-10-29 | End: 2017-10-30 | Stop reason: HOSPADM

## 2017-10-29 RX ADMIN — PENICILLIN G POTASSIUM 5 MILLION UNITS: 5000000 POWDER, FOR SOLUTION INTRAMUSCULAR; INTRAPLEURAL; INTRATHECAL; INTRAVENOUS at 21:42

## 2017-10-29 RX ADMIN — ALUMINUM HYDROXIDE, MAGNESIUM HYDROXIDE,SIMETHICONE 30 ML: 400; 400; 40 LIQUID ORAL at 23:50

## 2017-10-29 RX ADMIN — SODIUM CHLORIDE, POTASSIUM CHLORIDE, SODIUM LACTATE AND CALCIUM CHLORIDE: 600; 310; 30; 20 INJECTION, SOLUTION INTRAVENOUS at 20:30

## 2017-10-29 RX ADMIN — MISOPROSTOL 25 MCG: 100 TABLET ORAL at 21:42

## 2017-10-29 ASSESSMENT — LIFESTYLE VARIABLES
ALCOHOL_USE: NO
DO YOU DRINK ALCOHOL: NO
EVER_SMOKED: NEVER

## 2017-10-29 ASSESSMENT — PATIENT HEALTH QUESTIONNAIRE - PHQ9
SUM OF ALL RESPONSES TO PHQ QUESTIONS 1-9: 0
1. LITTLE INTEREST OR PLEASURE IN DOING THINGS: NOT AT ALL
SUM OF ALL RESPONSES TO PHQ9 QUESTIONS 1 AND 2: 0
2. FEELING DOWN, DEPRESSED, IRRITABLE, OR HOPELESS: NOT AT ALL

## 2017-10-30 PROCEDURE — A9270 NON-COVERED ITEM OR SERVICE: HCPCS | Performed by: OBSTETRICS & GYNECOLOGY

## 2017-10-30 PROCEDURE — 36415 COLL VENOUS BLD VENIPUNCTURE: CPT

## 2017-10-30 PROCEDURE — 85027 COMPLETE CBC AUTOMATED: CPT

## 2017-10-30 PROCEDURE — 303615 HCHG EPIDURAL/SPINAL ANESTHESIA FOR LABOR

## 2017-10-30 PROCEDURE — 700111 HCHG RX REV CODE 636 W/ 250 OVERRIDE (IP)

## 2017-10-30 PROCEDURE — 700102 HCHG RX REV CODE 250 W/ 637 OVERRIDE(OP): Performed by: OBSTETRICS & GYNECOLOGY

## 2017-10-30 PROCEDURE — 700105 HCHG RX REV CODE 258: Performed by: OBSTETRICS & GYNECOLOGY

## 2017-10-30 PROCEDURE — 700111 HCHG RX REV CODE 636 W/ 250 OVERRIDE (IP): Performed by: OBSTETRICS & GYNECOLOGY

## 2017-10-30 PROCEDURE — 304965 HCHG RECOVERY SERVICES

## 2017-10-30 PROCEDURE — 770002 HCHG ROOM/CARE - OB PRIVATE (112)

## 2017-10-30 PROCEDURE — 59409 OBSTETRICAL CARE: CPT

## 2017-10-30 PROCEDURE — 3E0R3BZ INTRODUCTION OF ANESTHETIC AGENT INTO SPINAL CANAL, PERCUTANEOUS APPROACH: ICD-10-PCS | Performed by: ANESTHESIOLOGY

## 2017-10-30 RX ORDER — VITAMIN A ACETATE, BETA CAROTENE, ASCORBIC ACID, CHOLECALCIFEROL, .ALPHA.-TOCOPHEROL ACETATE, DL-, THIAMINE MONONITRATE, RIBOFLAVIN, NIACINAMIDE, PYRIDOXINE HYDROCHLORIDE, FOLIC ACID, CYANOCOBALAMIN, CALCIUM CARBONATE, FERROUS FUMARATE, ZINC OXIDE, CUPRIC OXIDE 3080; 12; 120; 400; 1; 1.84; 3; 20; 22; 920; 25; 200; 27; 10; 2 [IU]/1; UG/1; MG/1; [IU]/1; MG/1; MG/1; MG/1; MG/1; MG/1; [IU]/1; MG/1; MG/1; MG/1; MG/1; MG/1
1 TABLET, FILM COATED ORAL EVERY MORNING
Status: DISCONTINUED | OUTPATIENT
Start: 2017-10-30 | End: 2017-11-01 | Stop reason: HOSPADM

## 2017-10-30 RX ORDER — ONDANSETRON 2 MG/ML
4 INJECTION INTRAMUSCULAR; INTRAVENOUS EVERY 6 HOURS PRN
Status: DISCONTINUED | OUTPATIENT
Start: 2017-10-30 | End: 2017-11-01 | Stop reason: HOSPADM

## 2017-10-30 RX ORDER — SODIUM CHLORIDE, SODIUM LACTATE, POTASSIUM CHLORIDE, CALCIUM CHLORIDE 600; 310; 30; 20 MG/100ML; MG/100ML; MG/100ML; MG/100ML
INJECTION, SOLUTION INTRAVENOUS PRN
Status: DISCONTINUED | OUTPATIENT
Start: 2017-10-30 | End: 2017-11-01 | Stop reason: HOSPADM

## 2017-10-30 RX ORDER — BISACODYL 10 MG
10 SUPPOSITORY, RECTAL RECTAL PRN
Status: DISCONTINUED | OUTPATIENT
Start: 2017-10-30 | End: 2017-11-01 | Stop reason: HOSPADM

## 2017-10-30 RX ORDER — HYDROCODONE BITARTRATE AND ACETAMINOPHEN 5; 325 MG/1; MG/1
1-2 TABLET ORAL EVERY 4 HOURS PRN
Status: DISCONTINUED | OUTPATIENT
Start: 2017-10-30 | End: 2017-11-01 | Stop reason: HOSPADM

## 2017-10-30 RX ORDER — BUPIVACAINE HYDROCHLORIDE 2.5 MG/ML
INJECTION, SOLUTION EPIDURAL; INFILTRATION; INTRACAUDAL
Status: ACTIVE
Start: 2017-10-30 | End: 2017-10-30

## 2017-10-30 RX ORDER — IBUPROFEN 600 MG/1
600 TABLET ORAL EVERY 8 HOURS PRN
Status: DISCONTINUED | OUTPATIENT
Start: 2017-10-30 | End: 2017-11-01 | Stop reason: HOSPADM

## 2017-10-30 RX ORDER — DOCUSATE SODIUM 100 MG/1
100 CAPSULE, LIQUID FILLED ORAL 2 TIMES DAILY PRN
Status: DISCONTINUED | OUTPATIENT
Start: 2017-10-30 | End: 2017-11-01 | Stop reason: HOSPADM

## 2017-10-30 RX ORDER — ROPIVACAINE HYDROCHLORIDE 2 MG/ML
INJECTION, SOLUTION EPIDURAL; INFILTRATION; PERINEURAL
Status: COMPLETED
Start: 2017-10-30 | End: 2017-10-30

## 2017-10-30 RX ORDER — MISOPROSTOL 200 UG/1
600 TABLET ORAL
Status: DISCONTINUED | OUTPATIENT
Start: 2017-10-30 | End: 2017-11-01 | Stop reason: HOSPADM

## 2017-10-30 RX ORDER — METHYLERGONOVINE MALEATE 0.2 MG/ML
0.2 INJECTION INTRAVENOUS
Status: DISCONTINUED | OUTPATIENT
Start: 2017-10-30 | End: 2017-11-01 | Stop reason: HOSPADM

## 2017-10-30 RX ORDER — ONDANSETRON 4 MG/1
4 TABLET, ORALLY DISINTEGRATING ORAL EVERY 6 HOURS PRN
Status: DISCONTINUED | OUTPATIENT
Start: 2017-10-30 | End: 2017-11-01 | Stop reason: HOSPADM

## 2017-10-30 RX ORDER — MAG HYDROX/ALUMINUM HYD/SIMETH 400-400-40
1 SUSPENSION, ORAL (FINAL DOSE FORM) ORAL
Status: DISCONTINUED | OUTPATIENT
Start: 2017-10-30 | End: 2017-11-01 | Stop reason: HOSPADM

## 2017-10-30 RX ORDER — SODIUM CHLORIDE, SODIUM LACTATE, POTASSIUM CHLORIDE, CALCIUM CHLORIDE 600; 310; 30; 20 MG/100ML; MG/100ML; MG/100ML; MG/100ML
INJECTION, SOLUTION INTRAVENOUS CONTINUOUS
Status: DISCONTINUED | OUTPATIENT
Start: 2017-10-30 | End: 2017-11-01 | Stop reason: HOSPADM

## 2017-10-30 RX ADMIN — SODIUM CHLORIDE 2.5 MILLION UNITS: 9 INJECTION, SOLUTION INTRAVENOUS at 10:14

## 2017-10-30 RX ADMIN — FENTANYL CITRATE 100 MCG: 50 INJECTION, SOLUTION INTRAMUSCULAR; INTRAVENOUS at 08:42

## 2017-10-30 RX ADMIN — MISOPROSTOL 25 MCG: 100 TABLET ORAL at 02:10

## 2017-10-30 RX ADMIN — SODIUM CHLORIDE, POTASSIUM CHLORIDE, SODIUM LACTATE AND CALCIUM CHLORIDE: 600; 310; 30; 20 INJECTION, SOLUTION INTRAVENOUS at 05:03

## 2017-10-30 RX ADMIN — SODIUM CHLORIDE 2.5 MILLION UNITS: 9 INJECTION, SOLUTION INTRAVENOUS at 06:06

## 2017-10-30 RX ADMIN — SODIUM CHLORIDE, POTASSIUM CHLORIDE, SODIUM LACTATE AND CALCIUM CHLORIDE: 600; 310; 30; 20 INJECTION, SOLUTION INTRAVENOUS at 09:16

## 2017-10-30 RX ADMIN — SODIUM CHLORIDE, POTASSIUM CHLORIDE, SODIUM LACTATE AND CALCIUM CHLORIDE: 600; 310; 30; 20 INJECTION, SOLUTION INTRAVENOUS at 11:48

## 2017-10-30 RX ADMIN — OXYTOCIN 125 ML: 10 INJECTION, SOLUTION INTRAMUSCULAR; INTRAVENOUS at 16:35

## 2017-10-30 RX ADMIN — IBUPROFEN 600 MG: 600 TABLET, FILM COATED ORAL at 18:14

## 2017-10-30 RX ADMIN — OXYTOCIN 1 MILLI-UNITS/MIN: 10 INJECTION, SOLUTION INTRAMUSCULAR; INTRAVENOUS at 06:31

## 2017-10-30 RX ADMIN — SODIUM CHLORIDE 2.5 MILLION UNITS: 9 INJECTION, SOLUTION INTRAVENOUS at 01:53

## 2017-10-30 RX ADMIN — HYDROCODONE BITARTRATE AND ACETAMINOPHEN 1 TABLET: 5; 325 TABLET ORAL at 21:11

## 2017-10-30 RX ADMIN — ROPIVACAINE HYDROCHLORIDE 100 ML: 2 INJECTION, SOLUTION EPIDURAL; INFILTRATION at 09:49

## 2017-10-30 ASSESSMENT — PAIN SCALES - GENERAL
PAINLEVEL_OUTOF10: 0
PAINLEVEL_OUTOF10: 1
PAINLEVEL_OUTOF10: 5
PAINLEVEL_OUTOF10: 0

## 2017-10-30 NOTE — PROGRESS NOTES
; EDC 11/3/17; EGA 39.2     - Pt to L&D for elective IOL. Pt to room S 215   - External monitors in place X2. VSS. Pt reports good FM. No complaints of contractions, ROM or vaginal bleeding. SVE 1-250/-2. Pt denies HA, swelling, RUQ pain, visual changes. Pt sister and mother at bedside. IV started, labs sent. Admission profile completed. POC discussed with pt and family members, all questions answered.   2100 - Dr. Long at bedside. Orders received.   214 - Cytotec placed per active MD order. SVE unchanged.   0208 - SVE by EDNA Montalvo RN. SVE 1-2/-2.   0210 - Cytotec placed per active order (see MAR)  06 - Dr. Long at bedside. SVE 3-50/-3. U/S at bedside, confirmed vertex position.   0610 - Orders to start pitocin per MAR.  0631 - Pit started per MAR  07 - Report given to JADA Landaverde RN.

## 2017-10-30 NOTE — CARE PLAN
Problem: Infection  Goal: Will remain free from infection  Outcome: PROGRESSING AS EXPECTED  Pt remains free from s/s of infection, VSS, afebrile.    Problem: Knowledge Deficit  Goal: Knowledge of disease process/condition, treatment plan, diagnostic tests, and medications will improve  Outcome: PROGRESSING AS EXPECTED  POC discussed with patient and family, questions answered.

## 2017-10-30 NOTE — H&P
"History and Physical      Say Merrill is a 32 y.o. year old female  at 39w2d who presents for elective IOL.     Subjective:   negative  For CTXS.   negative Feels pain   negative for LOF  negative for vaginal bleeding.   positive for fetal movement    ROS: Pertinent items are noted in HPI.    History reviewed. No pertinent past medical history.  History reviewed. No pertinent surgical history.  OB History    Para Term  AB Living   4 2 2   1 2   SAB TAB Ectopic Molar Multiple Live Births   1         2      # Outcome Date GA Lbr Parveen/2nd Weight Sex Delivery Anes PTL Lv   4 Current            3 Term 12/23/15 39w2d  3.63 kg (8 lb) F Vag-Spont  N MIROSLAVA      Birth Comments: [facility]   2 SAB  9w0d          1 Term 08 40w3d  3.317 kg (7 lb 5 oz) F Vag-Spont  N MIROSLAVA        Social History     Social History   • Marital status:      Spouse name: N/A   • Number of children: N/A   • Years of education: N/A     Occupational History   • Not on file.     Social History Main Topics   • Smoking status: Never Smoker   • Smokeless tobacco: Never Used   • Alcohol use No   • Drug use: No   • Sexual activity: Not on file     Other Topics Concern   • Not on file     Social History Narrative   • No narrative on file     Allergies: Review of patient's allergies indicates no known allergies.    Current Facility-Administered Medications:   •  LACTATED RINGERS IV SOLN, , , ,     Prenatal care with [facility] starting at 17 wks with following problems:  Patient Active Problem List    Diagnosis Date Noted   • Positive GBS test 10/18/2017   • Supervision of normal pregnancy in third trimester 2017               Objective:      Height 1.702 m (5' 7\"), weight (!) 133.4 kg (294 lb), last menstrual period 2017, unknown if currently breastfeeding.    General:   no acute distress   Skin:   normal   HEENT:  PERRLA   Lungs:   CTA bilateral   Heart:   S1, S2 normal, no murmur, click, rub or gallop, regular rate and " rhythm, brisk carotid upstroke without bruits, peripheral pulses very brisk, chest is clear without rales or wheezing, no pedal edema, no JVD, no hepatosplenomegaly   Abdomen:   gravid, NT   EFW:  4000 grams   Pelvis:  adequate with gynecoid pelvis   FHT:  120s BPM, moderate variability, + accels   Uterine Size: S>D   Presentations: Cephalic   Cervix: Per nurse    Dilation: 1-2 cm    Effacement: 50%    Station:  -2    Consistency: Medium    Position: Posterior     Lab Review  Lab:   Blood type: O     Recent Results (from the past 5880 hour(s))   POCT Pregnancy    Collection Time: 04/03/17  4:20 PM   Result Value Ref Range    POC Urine Pregnancy Test Positive Negative    Internal Control Positive Positive     Internal Control Negative Negative    POCT Urinalysis    Collection Time: 05/30/17 10:34 AM   Result Value Ref Range    POC Color  Negative    POC Appearance  Negative    POC Leukocyte Esterase negative Negative    POC Nitrites negative Negative    POC Urobiligen  Negative (0.2) mg/dL    POC Protein trace Negative mg/dL    POC Urine PH 7.5 5.0 - 8.0    POC Blood negative Negative    POC Specific Gravity 1.010 <1.005 - >1.030    POC Ketones negative Negative mg/dL    POC Biliruben  Negative mg/dL    POC Glucose negative Negative mg/dL   Chlamydia/GC PCR Urine or Swab    Collection Time: 05/30/17 11:06 AM   Result Value Ref Range    Source Vaginal     C. trachomatis by PCR Negative Negative    N. gonorrhoeae by PCR Negative Negative   PREG CNTR PRENATAL PN    Collection Time: 05/31/17 12:44 PM   Result Value Ref Range    WBC 10.5 4.8 - 10.8 K/uL    RBC 4.41 4.20 - 5.40 M/uL    Hemoglobin 11.6 (L) 12.0 - 16.0 g/dL    Hematocrit 36.6 (L) 37.0 - 47.0 %    MCV 83.0 81.4 - 97.8 fL    MCH 26.3 (L) 27.0 - 33.0 pg    MCHC 31.7 (L) 33.6 - 35.0 g/dL    RDW 42.2 35.9 - 50.0 fL    Platelet Count 281 164 - 446 K/uL    MPV 9.5 9.0 - 12.9 fL    Neutrophils-Polys 78.20 (H) 44.00 - 72.00 %    Lymphocytes 14.30 (L) 22.00 - 41.00 %     Monocytes 5.30 0.00 - 13.40 %    Eosinophils 1.20 0.00 - 6.90 %    Basophils 0.60 0.00 - 1.80 %    Immature Granulocytes 0.40 0.00 - 0.90 %    Nucleated RBC 0.00 /100 WBC    Neutrophils (Absolute) 8.18 (H) 2.00 - 7.15 K/uL    Lymphs (Absolute) 1.50 1.00 - 4.80 K/uL    Monos (Absolute) 0.56 0.00 - 0.85 K/uL    Eos (Absolute) 0.13 0.00 - 0.51 K/uL    Baso (Absolute) 0.06 0.00 - 0.12 K/uL    Immature Granulocytes (abs) 0.04 0.00 - 0.11 K/uL    NRBC (Absolute) 0.00 K/uL    Rubella IgG Antibody 77.30 IU/mL    Syphilis, Treponemal Qual Non Reactive Non Reactive    Hepatitis B Surface Antigen Negative Negative    Color Yellow     Character Clear     Specific Gravity 1.019 <1.035    Ph 6.5 5.0 - 8.0    Glucose Negative Negative mg/dL    Ketones Negative Negative mg/dL    Protein Negative Negative mg/dL    Bilirubin Negative Negative    Nitrite Negative Negative    Leukocyte Esterase Negative Negative    Occult Blood Negative Negative    Micro Urine Req see below     Culture Indicated No UA Culture   AFP TETRA    Collection Time: 05/31/17 12:44 PM   Result Value Ref Range    AFP Value -Eia 31 ng/mL    AFP MOM Value 1.03     Hcg Value 8,997 IU/L    Hcg Mom 0.47     Ue3 Value 1.08 ng/mL    Ue3 Mom 1.03     Interpretation Normal     Maternal [age]NANT 31[age]5 yr    Gestational Age Based On LNMP     Gestational Age 17.71 weeks    Insulin Dependent Diabetes No     Race Black     Multiple Pregnancy One     Yvette Value -Eia 97 pg/mL    Yvette Mom Value 0.92     Maternal Weight 278 lbs    Err Maternal Scrn AFP See Note    HIV ANTIBODIES    Collection Time: 05/31/17 12:44 PM   Result Value Ref Range    HIV Ag/Ab Combo Assay Non Reactive Non Reactive   OP PRENATAL PANEL-BLOOD BANK    Collection Time: 05/31/17 12:44 PM   Result Value Ref Range    ABO Grouping Only O     Rh Grouping Only POS     Antibody Screen Scrn NEG    GLUCOSE 1HR GESTATIONAL    Collection Time: 08/10/17 12:57 PM   Result Value Ref Range    Glucose, Post Dose 136 70 -  139 mg/dL   CBC WITHOUT DIFFERENTIAL    Collection Time: 08/10/17 12:57 PM   Result Value Ref Range    WBC 12.1 (H) 4.8 - 10.8 K/uL    RBC 4.20 4.20 - 5.40 M/uL    Hemoglobin 10.9 (L) 12.0 - 16.0 g/dL    Hematocrit 35.0 (L) 37.0 - 47.0 %    MCV 83.3 81.4 - 97.8 fL    MCH 26.0 (L) 27.0 - 33.0 pg    MCHC 31.1 (L) 33.6 - 35.0 g/dL    RDW 43.3 35.9 - 50.0 fL    Platelet Count 275 164 - 446 K/uL    MPV 9.5 9.0 - 12.9 fL   T.PALLIDUM AB EIA    Collection Time: 08/10/17 12:57 PM   Result Value Ref Range    Syphilis, Treponemal Qual Non Reactive Non Reactive   GRP B STREP, BY PCR (WAGGONER BROTH)    Collection Time: 10/03/17 10:43 AM   Result Value Ref Range    Strep Gp B DNA PCR POSITIVE (A) Negative        Assessment:   Say Merrill at 39w2d  Labor status: Not in labor. Here for elective IOL.   Obstetrical history significant for   Patient Active Problem List    Diagnosis Date Noted   • Positive GBS test 10/18/2017   • Supervision of normal pregnancy in third trimester 2017   .      Plan:     Admit to L&D  GBS positive - will start PCN  Cervix unfavorable - cytotec for ripening, pitocin when favorable.   Anticipate .

## 2017-10-30 NOTE — PROGRESS NOTES
0700 report from leonides JACKSON.patient is sitting up-difficult to keep baby in on monitor.repositioned to left side-peanut ball requested by Dr Hamilton.0840 IV bolus for epidural.0842 fentanyl given.0900 relaxed.1035 SVE by Dr Hamilton-/-3 vertex.pitocin turned down to 2mu-Dr hamilton would like [pitocin to not go lower than 2 mu's.1240 9cm's.uncomfortable-[id number] bolus not working.1315 bolus given by Dr Eden.1325 comfortable-left side on peanut ball.1549 delivered female infant apgar 8/9.

## 2017-10-30 NOTE — PROGRESS NOTES
"Say Merrill at 39w3d admitted for elective IOL      Subjective: positive for CTXS.  negative for feeling pain from CTXs. negative for LOF. negative for vaginal bleeding.   Pain is well controlled: yes. Desires epidural: yes.    Blood pressure 125/71, pulse 68, temperature 36.8 °C (98.3 °F), resp. rate 16, height 1.702 m (5' 7\"), weight (!) 133.4 kg (294 lb), last menstrual period 2017, SpO2 94 %, unknown if currently breastfeeding.    Physical exam  FHT reactive, with accelerations  SVE 5/80/-3  AROM no   CTXs Q 2-3 mins    Meds:     Epidural : .yes  Magnesium sulfate: .no  Pitocin: .yes and @ 2mu    Labs:    Lab:   Recent Results (from the past 24 hour(s))   Hold Blood Bank Specimen (Not Tested)    Collection Time: 10/29/17  8:35 PM   Result Value Ref Range    Holding Tube - Bb DONE    CBC WITH DIFFERENTIAL    Collection Time: 10/29/17  8:35 PM   Result Value Ref Range    WBC 10.9 (H) 4.8 - 10.8 K/uL    RBC 4.26 4.20 - 5.40 M/uL    Hemoglobin 10.4 (L) 12.0 - 16.0 g/dL    Hematocrit 33.5 (L) 37.0 - 47.0 %    MCV 78.6 (L) 81.4 - 97.8 fL    MCH 24.4 (L) 27.0 - 33.0 pg    MCHC 31.0 (L) 33.6 - 35.0 g/dL    RDW 45.5 35.9 - 50.0 fL    Platelet Count 249 164 - 446 K/uL    MPV 9.6 9.0 - 12.9 fL    Neutrophils-Polys 72.60 (H) 44.00 - 72.00 %    Lymphocytes 16.60 (L) 22.00 - 41.00 %    Monocytes 9.30 0.00 - 13.40 %    Eosinophils 0.90 0.00 - 6.90 %    Basophils 0.30 0.00 - 1.80 %    Immature Granulocytes 0.30 0.00 - 0.90 %    Nucleated RBC 0.00 /100 WBC    Neutrophils (Absolute) 7.93 (H) 2.00 - 7.15 K/uL    Lymphs (Absolute) 1.81 1.00 - 4.80 K/uL    Monos (Absolute) 1.01 (H) 0.00 - 0.85 K/uL    Eos (Absolute) 0.10 0.00 - 0.51 K/uL    Baso (Absolute) 0.03 0.00 - 0.12 K/uL    Immature Granulocytes (abs) 0.03 0.00 - 0.11 K/uL    NRBC (Absolute) 0.00 K/uL       A/P  Say Merrill is 32 y.o.  at 39w3d  Continue pitocin    "

## 2017-10-31 LAB
ERYTHROCYTE [DISTWIDTH] IN BLOOD BY AUTOMATED COUNT: 45.8 FL (ref 35.9–50)
HCT VFR BLD AUTO: 32.3 % (ref 37–47)
HGB BLD-MCNC: 10.3 G/DL (ref 12–16)
MCH RBC QN AUTO: 25.4 PG (ref 27–33)
MCHC RBC AUTO-ENTMCNC: 31.9 G/DL (ref 33.6–35)
MCV RBC AUTO: 79.8 FL (ref 81.4–97.8)
PLATELET # BLD AUTO: 201 K/UL (ref 164–446)
PMV BLD AUTO: 9.4 FL (ref 9–12.9)
RBC # BLD AUTO: 4.05 M/UL (ref 4.2–5.4)
WBC # BLD AUTO: 13.4 K/UL (ref 4.8–10.8)

## 2017-10-31 PROCEDURE — 770002 HCHG ROOM/CARE - OB PRIVATE (112)

## 2017-10-31 PROCEDURE — 3E033VJ INTRODUCTION OF OTHER HORMONE INTO PERIPHERAL VEIN, PERCUTANEOUS APPROACH: ICD-10-PCS | Performed by: OBSTETRICS & GYNECOLOGY

## 2017-10-31 PROCEDURE — 700102 HCHG RX REV CODE 250 W/ 637 OVERRIDE(OP): Performed by: OBSTETRICS & GYNECOLOGY

## 2017-10-31 PROCEDURE — A9270 NON-COVERED ITEM OR SERVICE: HCPCS | Performed by: OBSTETRICS & GYNECOLOGY

## 2017-10-31 RX ADMIN — HYDROCODONE BITARTRATE AND ACETAMINOPHEN 1 TABLET: 5; 325 TABLET ORAL at 21:16

## 2017-10-31 RX ADMIN — HYDROCODONE BITARTRATE AND ACETAMINOPHEN 1 TABLET: 5; 325 TABLET ORAL at 07:24

## 2017-10-31 RX ADMIN — Medication 1 TABLET: at 07:59

## 2017-10-31 RX ADMIN — HYDROCODONE BITARTRATE AND ACETAMINOPHEN 1 TABLET: 5; 325 TABLET ORAL at 15:43

## 2017-10-31 RX ADMIN — IBUPROFEN 600 MG: 600 TABLET, FILM COATED ORAL at 02:16

## 2017-10-31 RX ADMIN — IBUPROFEN 600 MG: 600 TABLET, FILM COATED ORAL at 11:33

## 2017-10-31 RX ADMIN — IBUPROFEN 600 MG: 600 TABLET, FILM COATED ORAL at 21:15

## 2017-10-31 ASSESSMENT — PAIN SCALES - GENERAL
PAINLEVEL_OUTOF10: 2
PAINLEVEL_OUTOF10: 6
PAINLEVEL_OUTOF10: 1
PAINLEVEL_OUTOF10: 8
PAINLEVEL_OUTOF10: 5
PAINLEVEL_OUTOF10: 3
PAINLEVEL_OUTOF10: 5
PAINLEVEL_OUTOF10: 0
PAINLEVEL_OUTOF10: 0
PAINLEVEL_OUTOF10: 1

## 2017-10-31 NOTE — CARE PLAN
Problem: Communication  Goal: The ability to communicate needs accurately and effectively will improve  Outcome: PROGRESSING AS EXPECTED  Reviewed plan of care with patient who verbalized understanding.    Problem: Altered physiologic condition related to immediate post-delivery state and potential for bleeding/hemorrhage  Goal: Patient physiologically stable as evidenced by normal lochia, palpable uterine involution and vital signs within normal limits  Outcome: PROGRESSING AS EXPECTED  Fundus firm.  Lochia scant.  VSS.

## 2017-10-31 NOTE — CARE PLAN
Problem: Safety  Goal: Free from accidental injury  Outcome: MET Date Met: 10/30/17  Pt and infant free from accidental injury while on unit.    Problem: Pain  Goal: Alleviation of Pain or a reduction in pain to the patient's comfort goal  Outcome: MET Date Met: 10/30/17  Pain alleviated with oral pain medication.    Problem: Risk for Infection, Impaired Wound Healing  Goal: Remain free from signs and symptoms of infection  Outcome: MET Date Met: 10/30/17  Pt free from signs and symptoms of infection.

## 2017-10-31 NOTE — PROGRESS NOTES
1900- Report received from JADA Barragan RN. Pt sitting up in bed, holding infant. Fabien (LINDSEY) at bedside.  POC discussed.    1925- Pt up to BR with standby assist. Steady ambulation. Successful void.    1933- Pt and infant transferred to PPU via wheelchair without incident, accompanied by Fabien (LINDSEY). Report given to DERREK Brian RN. Bands and cuddles verified.

## 2017-10-31 NOTE — PROGRESS NOTES
Reviewing moms breastfeeding history with her reveals that #1 baby was brought to the ER at day 7 with significant weight loss. Nipples cracked and bleeding. Mom stopped breastfeeding, reports feeling so sad about the situation. Baby #2 by day 3 was crying and furious at the breast and they started formula, never pumped.  Mom has a neg history of infertility, HTN, BR surgery.  There were breast changes in each pregnancy.  Her periods are irregular from 3-5 weeks between cycles.     In order to protect and maximize supply, I would recommend that mom  1. Offers both breasts at each feeding  2. Try to keep baby sucking for 20 minutes between the two breast - knowing that isn't always attainable.    3. . Begin  pumping after each feeding and return drops/volumes to infant.    Parents to discuss information and re-evaluate at the next feeding.     Outpatient follow up briefly discussed including weight checks and consultations,  but Lactation in am to evaluate plan and provide new discharge plan.

## 2017-10-31 NOTE — DISCHARGE SUMMARY
Discharge Summary:      Say Merrill      Admit Date:   10/29/2017  Discharge Date:  10/31/2017     Admitting diagnosis:  Pregnancy  IOL  Indication for care in labor or delivery  Discharge Diagnosis: Status post vaginal, spontaneous.  Pregnancy Complications: group B strep (treated)  Tubal Ligation:  no        History:  History reviewed. No pertinent past medical history.  OB History    Para Term  AB Living   4 2 2   1 2   SAB TAB Ectopic Molar Multiple Live Births   1         2      # Outcome Date GA Lbr Parveen/2nd Weight Sex Delivery Anes PTL Lv   4 Current            3 Term 12/23/15 39w2d  3.63 kg (8 lb) F Vag-Spont  N MIROSLAVA      Birth Comments: [facility]   2 SAB  9w0d          1 Term 08 40w3d  3.317 kg (7 lb 5 oz) F Vag-Spont  N MIROSLAVA           Review of patient's allergies indicates no known allergies.  Patient Active Problem List    Diagnosis Date Noted   • Indication for care in labor or delivery 10/29/2017   • Positive GBS test 10/18/2017   • Supervision of normal pregnancy in third trimester 2017        Hospital Course:   32 y.o. , now para 3, was admitted with the above mentioned diagnosis, underwent Induction of Labor, vaginal, spontaneous. Patient postpartum course was unremarkable, with progressive advancement in diet , ambulation and toleration of oral analgesia. Patient without complaints today and desires discharge.      Vitals:    10/30/17 1643 10/30/17 1916 10/30/17 2005 10/31/17 0000   BP: 138/69 136/83 121/74 115/70   Pulse: 75 85 80 77   Resp:  18 16 16   Temp:  37.2 °C (98.9 °F) 37.5 °C (99.5 °F) 37 °C (98.6 °F)   TempSrc:       SpO2:   97% 96%   Weight:       Height:           Current Facility-Administered Medications   Medication Dose   • ondansetron (ZOFRAN ODT) dispertab 4 mg  4 mg    Or   • ondansetron (ZOFRAN) syringe/vial injection 4 mg  4 mg   • lactated ringers infusion     • LR infusion     • PRN oxytocin (PITOCIN) (20 Units/1000 mL) PRN for  excessive uterine bleeding - See Admin Instr  125-999 mL/hr   • misoprostol (CYTOTEC) tablet 600 mcg  600 mcg   • methylergonovine (METHERGINE) injection 0.2 mg  0.2 mg   • docusate sodium (COLACE) capsule 100 mg  100 mg   • bisacodyl (DULCOLAX) suppository 10 mg  10 mg   • glycerin (adult) suppository 1 Suppository  1 Suppository   • magnesium hydroxide (MILK OF MAGNESIA) suspension 30 mL  30 mL   • prenatal plus vitamin (STUARTNATAL 1+1) 27-1 MG tablet 1 Tab  1 Tab   • ibuprofen (MOTRIN) tablet 600 mg  600 mg   • hydrocodone-acetaminophen (NORCO) 5-325 MG per tablet 1-2 Tab  1-2 Tab       Exam:  Breast Exam: negative  Abdomen: Abdomen soft, non-tender. BS normal. No masses,  No organomegaly  Fundus Non Tender: yes  Incision: none  Perineum: perineum intact  Extremity: extremities, peripheral pulses and reflexes normal     Labs:  Recent Labs      10/29/17   2035  10/30/17   2356   WBC  10.9*  13.4*   RBC  4.26  4.05*   HEMOGLOBIN  10.4*  10.3*   HEMATOCRIT  33.5*  32.3*   MCV  78.6*  79.8*   MCH  24.4*  25.4*   MCHC  31.0*  31.9*   RDW  45.5  45.8   PLATELETCT  249  201   MPV  9.6  9.4        Activity:   Discharge to home  Pelvic Rest x 6 weeks    Assessment:  normal postpartum course  Discharge Assessment: No heavy bleeding or foul vaginal discharge      Follow up: [facility] in 6 weeks for vaginal ; 1 week for incision check.      Discharge Meds:   No current outpatient prescriptions on file.       Maria Dolores Rueda M.D.

## 2017-10-31 NOTE — PROGRESS NOTES
1100- Discharge education sheet reviewed with patient and FOB who verbalized understanding and stated they have no questions.  Patient stated she wants to stay until tomorrow to work on breastfeeding.  1123- Dr. Floyd notified that patient desires to stay until tomorrow.  Telephone order received to cancel discharge.

## 2017-10-31 NOTE — CARE PLAN
Problem: Altered physiologic condition related to immediate post-delivery state and potential for bleeding/hemorrhage  Goal: Patient physiologically stable as evidenced by normal lochia, palpable uterine involution and vital signs within normal limits  Outcome: PROGRESSING AS EXPECTED  Fundus firm, lochia light, VSS.    Problem: Alteration in comfort related to episiotomy, vaginal repair and/or after birth pains  Goal: Patient verbalizes acceptable pain level  Outcome: PROGRESSING AS EXPECTED  Pt verbalizes acceptable pain level.  Pt aware of pain medications available, and asks for them when needed.

## 2017-10-31 NOTE — PROGRESS NOTES
0650- Report received from MANUEL Hsu.  Patient sleeping.  0800- Patient assessment done.  Patient stated that she is voiding without difficulty and passing flatus.  Patient denied dizziness and stated that she is walking without difficulty.  Discussed pain management plan and patient prefers to call for pain intervention as needed.  Reviewed plan of care.  Patient encouraged to watch the N(i)Â² Transition to Home video.  FOB at bedside.

## 2017-10-31 NOTE — DELIVERY NOTE
DATE OF SERVICE:  10/30/2017    HISTORY OF PRESENT ILLNESS:  Patient is a 32-year-old female  4, para   2-0-1-2 at 39-3/7 weeks who was admitted last night for elective induction of   labor.  The patient at that time was noted to be 1-2, 50%, -2, medium, and   posterior.  The patient was started on Cytotec for cervical ripening and   approximately 8:00 this morning the patient was started on Pitocin induction   of labor.  The patient delivered a viable female infant, Apgars of 8 and 9   over intact perineum.  There was no nuchal cord encountered.  Bulb suction was   performed on the perineum.  Remainder of infant delivered without   complications.  Placenta delivered spontaneously.  Three-vessel cord noted to   be intact.  EBL was approximately 200 mL.  Anesthesia is epidural.  The   patient was GBS positive and received 4 doses of antibiotics.  Currently,   mother and infant are both doing well.       ____________________________________     MD BETTY KIRKLAND / JAYNE    DD:  10/30/2017 16:07:09  DT:  10/30/2017 17:41:07    D#:  [id number]  Job#:  [id number]

## 2017-11-01 VITALS
DIASTOLIC BLOOD PRESSURE: 79 MMHG | OXYGEN SATURATION: 95 % | HEART RATE: 79 BPM | WEIGHT: 293 LBS | SYSTOLIC BLOOD PRESSURE: 110 MMHG | HEIGHT: 67 IN | TEMPERATURE: 97.8 F | BODY MASS INDEX: 45.99 KG/M2 | RESPIRATION RATE: 18 BRPM

## 2017-11-01 PROCEDURE — A9270 NON-COVERED ITEM OR SERVICE: HCPCS | Performed by: OBSTETRICS & GYNECOLOGY

## 2017-11-01 PROCEDURE — 700112 HCHG RX REV CODE 229: Performed by: OBSTETRICS & GYNECOLOGY

## 2017-11-01 PROCEDURE — 700102 HCHG RX REV CODE 250 W/ 637 OVERRIDE(OP): Performed by: OBSTETRICS & GYNECOLOGY

## 2017-11-01 RX ADMIN — HYDROCODONE BITARTRATE AND ACETAMINOPHEN 1 TABLET: 5; 325 TABLET ORAL at 09:21

## 2017-11-01 RX ADMIN — Medication 1 TABLET: at 08:10

## 2017-11-01 RX ADMIN — IBUPROFEN 600 MG: 600 TABLET, FILM COATED ORAL at 07:12

## 2017-11-01 RX ADMIN — DOCUSATE SODIUM 100 MG: 100 CAPSULE ORAL at 09:21

## 2017-11-01 ASSESSMENT — COPD QUESTIONNAIRES
DO YOU EVER COUGH UP ANY MUCUS OR PHLEGM?: NO/ONLY WITH OCCASIONAL COLDS OR INFECTIONS
COPD SCREENING SCORE: 0
HAVE YOU SMOKED AT LEAST 100 CIGARETTES IN YOUR ENTIRE LIFE: NO/DON'T KNOW
DURING THE PAST 4 WEEKS HOW MUCH DID YOU FEEL SHORT OF BREATH: NONE/LITTLE OF THE TIME

## 2017-11-01 ASSESSMENT — PAIN SCALES - GENERAL
PAINLEVEL_OUTOF10: 5
PAINLEVEL_OUTOF10: 4
PAINLEVEL_OUTOF10: 2
PAINLEVEL_OUTOF10: 0

## 2017-11-01 NOTE — CARE PLAN
Problem: Altered physiologic condition related to immediate post-delivery state and potential for bleeding/hemorrhage  Goal: Patient physiologically stable as evidenced by normal lochia, palpable uterine involution and vital signs within normal limits  Outcome: PROGRESSING AS EXPECTED  Fundal massage done with light bleeding    Problem: Alteration in comfort related to episiotomy, vaginal repair and/or after birth pains  Goal: Patient is able to ambulate, care for self and infant  Outcome: PROGRESSING AS EXPECTED  Pain medicine given as requested

## 2017-11-01 NOTE — PROGRESS NOTES
Baby in crib sucking on pacifier. Teaching provided on not using the pacifier first 2 weeks until milk is established, early/late hunger cues reviewed. Breastfeeding plan, breastfeed every 2-3 hours on demand, when baby shows hunger cues. Discussed importance of skin to skin when breastfeeding.

## 2017-11-01 NOTE — DISCHARGE INSTRUCTIONS
POSTPARTUM DISCHARGE INSTRUCTIONS FOR MOM    YOB: 1985   Age: 32 y.o.               Admit Date: 10/29/2017     Discharge Date: 2017  Attending Doctor:  Maria Dolores Rueda M.D.                  Allergies:  Review of patient's allergies indicates no known allergies.    Discharged to home by car. Discharged via wheelchair, hospital escort: Yes.  Special equipment needed: Not Applicable  Belongings with: Personal  Be sure to schedule a follow-up appointment with your primary care doctor or any specialists as instructed.     Discharge Plan:   Influenza Vaccine Indication: Patient Refuses    REASONS TO CALL YOUR OBSTETRICIAN:  1.   Persistent fever or shaking chills (Temperature higher than 100.4)  2.   Heavy bleeding (soaking more than 1 pad per hour); Passing clots  3.   Foul odor from vagina  4.   Mastitis (Breast infection; breast pain, chills, fever, redness)  5.   Urinary pain, burning or frequency  6.   Episiotomy infection  7.   Abdominal incision infection  8.   Severe depression longer than 24 hours    HAND WASHING  · Prior to handling the baby.  · Before breastfeeding or bottle feeding baby.  · After using the bathroom or changing the baby's diaper.    WOUND CARE  Ask your physician for additional care instructions.  In general:    ·  Incision:      · Keep clean and dry.    · Do NOT lift anything heavier than your baby for up to 6 weeks.    · There should not be any opening or pus.      VAGINAL CARE  · Nothing inside vagina for 6 weeks: no sexual intercourse, tampons or douching.  · Bleeding may continue for 2-4 weeks.  Amount may vary.    · Call your physician for heavy bleeding which means soaking more than 1 pad per hour    BIRTH CONTROL  · It is possible to become pregnant at any time after delivery and while breastfeeding.  · Plan to discuss a method of birth control with your physician at your follow up visit. visit.    DIET AND ELIMINATION  · Eating more fiber (bran cereal, fruits, and  "vegetables) and drinking plenty of fluids will help to avoid constipation.  · Urinary frequency after childbirth is normal.    POSTPARTUM BLUES  During the first few days after birth, you may experience a sense of the \"blues\" which may include impatience, irritability or even crying.  These feeling come and go quickly.  However, as many as 1 in 10 women experience emotional symptoms known as postpartum depression.    Postpartum depression:  May start as early as the second or third day after delivery or take several weeks or months to develop.  Symptoms of \"blues\" are present, but are more intense:  Crying spells; loss of appetite; feelings of hopelessness or loss of control; fear of touching the baby; over concern or no concern at all about the baby; little or no concern about your own appearance/caring for yourself; and/or inability to sleep or excessive sleeping.  Contact your physician if you are experiencing any of these symptoms.    Crisis Hotline:  · [place] Crisis Hotline:  0-969-TAAWKQM  Or 1-616.473.4302  · [place] Crisis Hotline:  1-987.358.2176  Or 805-659-5120    PREVENTING SHAKEN BABY:  If you are angry or stressed, PUT THE BABY IN THE CRIB, step away, take some deep breaths, and wait until you are calm to care for the baby.  DO NOT SHAKE THE BABY.  You are not alone, call a supporter for help.    · Crisis Call Center 24/7 crisis line 402-958-5531 or 1-966.943.9391  · You can also text them, text \"ANSWER\" to 506593    QUIT SMOKING/TOBACCO USE:  I understand the use of any tobacco products increases my chance of suffering from future heart disease and could cause other illnesses which may shorten my life. Quitting the use of tobacco products is the single most important thing I can do to improve my health. For further information on smoking / tobacco cessation call a Toll Free Quit Line at 1-635.471.4867 (*[employer] [place]) or 1-558.753.9317 (American Lung Association) or you can access the web " based program at www.lungusa.org.    · [place] Tobacco Users Help Line:  (427) 325-8932       Toll Free: 1-886.217.9704  · Quit Tobacco Program [facility] (170)262-8837    DEPRESSION / SUICIDE RISK:  As you are discharged from this [facility], it is important to learn how to keep safe from harming yourself.    Recognize the warning signs:  · Abrupt changes in personality, positive or negative- including increase in energy   · Giving away possessions  · Change in eating patterns- significant weight changes-  positive or negative  · Change in sleeping patterns- unable to sleep or sleeping all the time   · Unwillingness or inability to communicate  · Depression  · Unusual sadness, discouragement and loneliness  · Talk of wanting to die  · Neglect of personal appearance   · Rebelliousness- reckless behavior  · Withdrawal from people/activities they love  · Confusion- inability to concentrate     If you or a loved one observes any of these behaviors or has concerns about self-harm, here's what you can do:  · Talk about it- your feelings and reasons for harming yourself  · Remove any means that you might use to hurt yourself (examples: pills, rope, extension cords, firearm)  · Get professional help from the community (Mental Health, Substance Abuse, psychological counseling)  · Do not be alone:Call your Safe Contact- someone whom you trust who will be there for you.  · Call your local CRISIS HOTLINE 672-2318 or 978-869-1809  · Call your local Children's Mobile Crisis Response Team [place] (962) 472-6677 or www.Faveous  · Call the toll free National Suicide Prevention Hotlines   · National Suicide Prevention Lifeline 060-798-PTPR (2824)  · National Hope Line Network 800-SUICIDE (970-7261)    DISCHARGE SURVEY:  Thank you for choosing [facility].  We hope we provided you with very good care.  You may be receiving a survey in the mail.  Please fill it out.  Your opinion is  valuable to us.    ADDITIONAL EDUCATIONAL MATERIALS GIVEN TO PATIENT:        My signature on this form indicates that:  1.  I have reviewed and understand the above information  2.  My questions regarding this information have been answered to my satisfaction.  3.  I have formulated a plan with my discharge nurse to obtain my prescribed medication for home.

## 2017-11-01 NOTE — PROGRESS NOTES
Pt doing well bonding with baby, Assessment done complained of mild to moderate abdominal pain medicated her as per doctor orders, Needs attended.

## 2017-11-01 NOTE — DISCHARGE SUMMARY
Discharge Summary:      Say Merrill      Admit Date:   10/29/2017  Discharge Date:  2017     Admitting diagnosis:  Pregnancy. GBS positive  IOL  Indication for care in labor or delivery  Discharge Diagnosis: Status post vaginal, spontaneous.  Pregnancy Complications: group B strep (treated)  Tubal Ligation:  no        History:  History reviewed. No pertinent past medical history.  OB History    Para Term  AB Living   4 2 2   1 2   SAB TAB Ectopic Molar Multiple Live Births   1         2      # Outcome Date GA Lbr Parveen/2nd Weight Sex Delivery Anes PTL Lv   4 Current            3 Term 12/23/15 39w2d  3.63 kg (8 lb) F Vag-Spont  N MIROSLAVA      Birth Comments: [facility]   2 SAB  9w0d          1 Term 08 40w3d  3.317 kg (7 lb 5 oz) F Vag-Spont  N MIROSLAVA           Review of patient's allergies indicates no known allergies.  Patient Active Problem List    Diagnosis Date Noted   • Indication for care in labor or delivery 10/29/2017   • Positive GBS test 10/18/2017   • Supervision of normal pregnancy in third trimester 2017        Hospital Course:   32 y.o. , now para 3, was admitted with the above mentioned diagnosis, underwent Induction of Labor, vaginal, spontaneous. Patient postpartum course was unremarkable, with progressive advancement in diet , ambulation and toleration of oral analgesia. Patient without complaints today and desires discharge.      Vitals:    10/30/17 2005 10/31/17 0000 10/31/17 0747 10/31/17 2000   BP: 121/74 115/70 128/87 101/63   Pulse: 80 77 70 75   Resp: 16 16 20 18   Temp: 37.5 °C (99.5 °F) 37 °C (98.6 °F) 36.5 °C (97.7 °F) 36.5 °C (97.7 °F)   TempSrc:       SpO2: 97% 96% 98% 96%   Weight:       Height:           Current Facility-Administered Medications   Medication Dose   • ondansetron (ZOFRAN ODT) dispertab 4 mg  4 mg    Or   • ondansetron (ZOFRAN) syringe/vial injection 4 mg  4 mg   • lactated ringers infusion     • LR infusion     • PRN oxytocin  (PITOCIN) (20 Units/1000 mL) PRN for excessive uterine bleeding - See Admin Instr  125-999 mL/hr   • misoprostol (CYTOTEC) tablet 600 mcg  600 mcg   • methylergonovine (METHERGINE) injection 0.2 mg  0.2 mg   • docusate sodium (COLACE) capsule 100 mg  100 mg   • bisacodyl (DULCOLAX) suppository 10 mg  10 mg   • glycerin (adult) suppository 1 Suppository  1 Suppository   • magnesium hydroxide (MILK OF MAGNESIA) suspension 30 mL  30 mL   • prenatal plus vitamin (STUARTNATAL 1+1) 27-1 MG tablet 1 Tab  1 Tab   • ibuprofen (MOTRIN) tablet 600 mg  600 mg   • hydrocodone-acetaminophen (NORCO) 5-325 MG per tablet 1-2 Tab  1-2 Tab       Exam:  Breast Exam: negative  Abdomen: Abdomen soft, non-tender. BS normal. No masses,  No organomegaly  Fundus Non Tender: yes  Incision: none  Perineum: perineum intact  Extremity: extremities, peripheral pulses and reflexes normal     Labs:  Recent Labs      10/29/17   2035  10/30/17   2356   WBC  10.9*  13.4*   RBC  4.26  4.05*   HEMOGLOBIN  10.4*  10.3*   HEMATOCRIT  33.5*  32.3*   MCV  78.6*  79.8*   MCH  24.4*  25.4*   MCHC  31.0*  31.9*   RDW  45.5  45.8   PLATELETCT  249  201   MPV  9.6  9.4        Activity:   Discharge to home  Pelvic Rest x 6 weeks    Assessment:  normal postpartum course  Discharge Assessment: No areas of skin breakdown/redness; surgical incision intact/healing     Follow up: .[facility] or [facility] [place] [facility] in 5 weeks for vaginal ; 1 week for incision check.      Discharge Meds:   No current outpatient prescriptions on file.       Elva May D.N.P.

## 2017-11-06 ENCOUNTER — TELEPHONE (OUTPATIENT)
Dept: OBGYN | Facility: MEDICAL CENTER | Age: 32
End: 2017-11-06

## 2017-12-20 ENCOUNTER — HOSPITAL ENCOUNTER (OUTPATIENT)
Facility: MEDICAL CENTER | Age: 32
End: 2017-12-20
Attending: OBSTETRICS & GYNECOLOGY
Payer: COMMERCIAL

## 2017-12-20 ENCOUNTER — POST PARTUM (OUTPATIENT)
Dept: OBGYN | Facility: MEDICAL CENTER | Age: 32
End: 2017-12-20
Payer: COMMERCIAL

## 2017-12-20 VITALS
WEIGHT: 278 LBS | HEIGHT: 67 IN | SYSTOLIC BLOOD PRESSURE: 130 MMHG | DIASTOLIC BLOOD PRESSURE: 78 MMHG | BODY MASS INDEX: 43.63 KG/M2

## 2017-12-20 DIAGNOSIS — Z11.51 SCREENING FOR HUMAN PAPILLOMAVIRUS (HPV): ICD-10-CM

## 2017-12-20 DIAGNOSIS — Z12.4 SCREENING FOR CERVICAL CANCER: ICD-10-CM

## 2017-12-20 DIAGNOSIS — Z01.419 WELL WOMAN EXAM WITH ROUTINE GYNECOLOGICAL EXAM: ICD-10-CM

## 2017-12-20 PROCEDURE — 88175 CYTOPATH C/V AUTO FLUID REDO: CPT

## 2017-12-20 PROCEDURE — 87624 HPV HI-RISK TYP POOLED RSLT: CPT

## 2017-12-20 PROCEDURE — 99395 PREV VISIT EST AGE 18-39: CPT | Performed by: OBSTETRICS & GYNECOLOGY

## 2017-12-20 NOTE — PROGRESS NOTES
"Say Merrill is a 32 y.o. y.o. female who presents for her Gynecologic Exam        HPI Comments: Pt presents for well woman exam. Pt has no complaints and is 7-8 weeks postpartum. She is sexually active and using condoms for birth control. She is bottle feeding. Patient's last menstrual period was 01/27/2017 (exact date).  .  Review of Systems   Pertinent positives documented in HPI and all other systems reviewed & are negative    All PMH, PSH, allergies, social history and FH reviewed and updated today:  History reviewed. No pertinent past medical history.  History reviewed. No pertinent surgical history.  Patient has no known allergies.  Social History     Social History   • Marital status:      Spouse name: N/A   • Number of children: N/A   • Years of education: N/A     Social History Main Topics   • Smoking status: Never Smoker   • Smokeless tobacco: Never Used   • Alcohol use No   • Drug use: No   • Sexual activity: Not on file     Other Topics Concern   • Not on file     Social History Narrative   • No narrative on file     History reviewed. No pertinent family history.  Medications:   Current Outpatient Prescriptions Ordered in [facility]   Medication Sig Dispense Refill   • Prenatal MV-Min-Fe Fum-FA-DHA (PRENATAL 1 PO) Take  by mouth every day.       No current [facility]-ordered facility-administered medications on file.           Objective:   Vital measurements:  Blood pressure 130/78, height 1.702 m (5' 7\"), weight (!) 126.1 kg (278 lb), last menstrual period 01/27/2017, not currently breastfeeding.  Body mass index is 43.54 kg/m². (Goal BM I>18 <25)    Physical Exam   Nursing note and vitals reviewed.  Constitutional: She is oriented to person, place, and time. She appears well-developed and well-nourished. No distress.     HEENT:   Head: Normocephalic and atraumatic.   Right Ear: External ear normal.   Left Ear: External ear normal.   Nose: Nose normal.   Eyes: Conjunctivae and EOM are normal. Pupils " are equal, round, and reactive to light. No scleral icterus.     Neck: Normal range of motion. Neck supple. No tracheal deviation present. No thyromegaly present.     Pulmonary/Chest: Effort normal and breath sounds normal. No respiratory distress. She has no wheezes. She has no rales. She exhibits no tenderness.     Cardiovascular: Regular, rate and rhythm. No JVD.    Abdominal: Soft. Bowel sounds are normal. She exhibits no distension and no mass. No tenderness. She has no rebound and no guarding.     Breast:  Symmetrical, normal consistency without masses., No dimpling or skin changes, Normal nipples without discharge, no axillary lymphadenopathy, negative    Genitourinary:  Pelvic exam was performed with patient supine.  External genitalia with no abnormal pigmentation, labial fusion,rash, tenderness, lesion or injury to the labia bilaterally.  Vagina is moist with no lesions, foul discharge, erythema, tenderness or bleeding. No foreign body around the vagina or signs of injury.   Cervix exhibits no motion tenderness, no discharge and no friability.   Uterus is AV not deviated, not enlarged, not fixed and not tender.  Right adnexum displays no mass, no tenderness and no fullness. Left adnexum displays no mass, no tenderness and no fullness.     Musculoskeletal: Normal range of motion. She exhibits no edema and no tenderness.     Lymphadenopathy: She has no cervical adenopathy.     Neurological: She is alert and oriented to person, place, and time. She exhibits normal muscle tone.     Skin: Skin is warm and dry. No rash noted. She is not diaphoretic. No erythema. No pallor.     Psychiatric: She has a normal mood and affect. Her behavior is normal. Judgment and thought content normal.               Assessment:     1. Well woman exam with routine gynecological exam  THINPREP PAP WITH HPV   2. Screening for human papillomavirus (HPV)  THINPREP PAP WITH HPV   3. Screening for cervical cancer  THINPREP PAP WITH HPV          Plan:   Pap and physical exam performed  Monthly SBE.  Counseling: breast self exam, STD prevention, HIV risk factors and prevention and family planning choices  Encourage exercise and proper diet.  Mammograms starting @ age 40 annually.  See medications and orders placed in encounter report.

## 2017-12-21 LAB
CYTOLOGY REG CYTOL: NORMAL
HPV HR 12 DNA CVX QL NAA+PROBE: NEGATIVE
HPV16 DNA SPEC QL NAA+PROBE: NEGATIVE
HPV18 DNA SPEC QL NAA+PROBE: NEGATIVE
SPECIMEN SOURCE: NORMAL

## 2018-09-18 ENCOUNTER — OFFICE VISIT (OUTPATIENT)
Dept: MEDICAL GROUP | Facility: PHYSICIAN GROUP | Age: 33
End: 2018-09-18
Payer: COMMERCIAL

## 2018-09-18 VITALS
SYSTOLIC BLOOD PRESSURE: 120 MMHG | HEART RATE: 70 BPM | WEIGHT: 276 LBS | HEIGHT: 67 IN | DIASTOLIC BLOOD PRESSURE: 78 MMHG | OXYGEN SATURATION: 97 % | BODY MASS INDEX: 43.32 KG/M2 | RESPIRATION RATE: 16 BRPM | TEMPERATURE: 97.3 F

## 2018-09-18 DIAGNOSIS — E66.01 MORBID OBESITY WITH BMI OF 40.0-44.9, ADULT (HCC): ICD-10-CM

## 2018-09-18 DIAGNOSIS — Z13.6 SCREENING FOR CARDIOVASCULAR CONDITION: ICD-10-CM

## 2018-09-18 DIAGNOSIS — Z13.0 ENCOUNTER FOR SCREENING FOR HEMATOLOGIC DISORDER: ICD-10-CM

## 2018-09-18 DIAGNOSIS — F34.1 DYSTHYMIA: ICD-10-CM

## 2018-09-18 DIAGNOSIS — F43.9 STRESS: ICD-10-CM

## 2018-09-18 PROCEDURE — 99204 OFFICE O/P NEW MOD 45 MIN: CPT | Performed by: NURSE PRACTITIONER

## 2018-09-18 NOTE — PROGRESS NOTES
Chief Complaint   Patient presents with   • Annual Exam     est care         This is a 33 y.o.female patient that presents today with the following: Establish care    Dysthymia  33-year-old female patient presents today to establish care with new PCP and discussed acute and chronic conditions.  She reports that ever since the birth of her third child last October she has had feelings of depression, anxiety and stress.  This is worsened with her move to the area.  She does deny suicidal and homicidal ideations, hallucinations, racing thoughts and flights of ideas.  She would like a complete workup including blood work, this is ordered for her.  She is not interested in pharmacotherapy at this time, but would like referral to psychology for possible ongoing therapy.      No visits with results within 1 Month(s) from this visit.   Latest known visit with results is:   Hospital Outpatient Visit on 12/20/2017   Component Date Value   • Cytology Reg 12/20/2017 See Path Report    • Source 12/20/2017 Endo/Cervical    • HPV Genotype 16 12/20/2017 Negative    • HPV Genotype 18 12/20/2017 Negative    • HPV Other High Risk Yas* 12/20/2017 Negative          clinical course has been stable    History reviewed. No pertinent past medical history.    Past Surgical History:   Procedure Laterality Date   • OTHER      none reported       History reviewed. No pertinent family history.    Patient has no known allergies.    No current [facility]Now-ordered outpatient prescriptions on file.     No current thereNow-ordered facility-administered medications on file.        Constitutional ROS: No unexpected change in weight, No weakness, No unexplained fevers, sweats, or chills  Pulmonary ROS: No chronic cough, sputum, or hemoptysis, No shortness of breath, No recent change in breathing  Cardiovascular ROS: No chest pain, No edema, No palpitations  Gastrointestinal ROS: No abdominal pain, No nausea, vomiting, diarrhea, or  "constipation  Musculoskeletal/Extremities ROS: No clubbing, No peripheral edema, No pain, redness or swelling on the joints  Neurologic ROS: Normal development, No seizures, No weakness  Psychiatric ROS: Positive per HPI  All other systems reviewed and are within normal limits    Physical exam:  /78   Pulse 70   Temp 36.3 °C (97.3 °F)   Resp 16   Ht 1.702 m (5' 7\")   Wt (!) 125.2 kg (276 lb)   SpO2 97%   BMI 43.23 kg/m²   General Appearance: Young female, alert, no distress, obese, well-groomed  Skin: Skin color, texture, turgor normal. No rashes or lesions.  Lungs: negative findings: normal respiratory rate and rhythm, lungs clear to auscultation  Heart: negative. RRR without murmur, gallop, or rubs.  No ectopy.  Abdomen: Abdomen soft, non-tender. BS normal. No masses,  No organomegaly  Musculoskeletal: negative findings: no evidence of joint instability, no evidence of muscle atrophy, no deformities present  Neurologic: intact, CN II through XII grossly intact    Medical decision making/discussion: Patient was advised to have labs done in the next couple of days or the next week and follow-up with me in 6-8 weeks, sooner if needed.  She has been referred to psychology.  She was encouraged to continue with healthy diet, regular physical activity and continued efforts towards weight loss.    Say was seen today for annual exam.    Diagnoses and all orders for this visit:    Stress  -     CBC WITH DIFFERENTIAL; Future  -     FERRITIN; Future  -     TSH WITH REFLEX TO FT4; Future  -     VITAMIN B12; Future  -     VITAMIN D,25 HYDROXY; Future  -     REFERRAL TO PSYCHOLOGY    Dysthymia  -     CBC WITH DIFFERENTIAL; Future  -     FERRITIN; Future  -     TSH WITH REFLEX TO FT4; Future  -     VITAMIN B12; Future  -     VITAMIN D,25 HYDROXY; Future  -     REFERRAL TO PSYCHOLOGY    Screening for cardiovascular condition  -     COMP METABOLIC PANEL; Future  -     LIPID PROFILE; Future    Encounter for screening " for hematologic disorder  -     CBC WITH DIFFERENTIAL; Future  -     FERRITIN; Future          Please note that this dictation was created using voice recognition software. I have made every reasonable attempt to correct obvious errors, but I expect that there are errors of grammar and possibly content that I did not discover before finalizing the note.

## 2018-09-18 NOTE — PATIENT INSTRUCTIONS
Labs anytime in the next couple of days or week    See me again in 6-8 weeks    Referral to psychology

## 2018-09-19 PROBLEM — F34.1 DYSTHYMIA: Status: ACTIVE | Noted: 2018-09-19

## 2018-09-19 PROBLEM — F43.9 STRESS: Status: ACTIVE | Noted: 2018-09-19

## 2018-09-19 PROBLEM — E66.01 MORBID OBESITY WITH BMI OF 40.0-44.9, ADULT (HCC): Status: ACTIVE | Noted: 2018-09-19

## 2018-09-19 NOTE — ASSESSMENT & PLAN NOTE
33-year-old female patient presents today to establish care with new PCP and discussed acute and chronic conditions.  She reports that ever since the birth of her third child last October she has had feelings of depression, anxiety and stress.  This is worsened with her move to the area.  She does deny suicidal and homicidal ideations, hallucinations, racing thoughts and flights of ideas.  She would like a complete workup including blood work, this is ordered for her.  She is not interested in pharmacotherapy at this time, but would like referral to psychology for possible ongoing therapy.

## 2018-11-03 ENCOUNTER — HOSPITAL ENCOUNTER (OUTPATIENT)
Dept: LAB | Facility: MEDICAL CENTER | Age: 33
End: 2018-11-03
Attending: NURSE PRACTITIONER
Payer: COMMERCIAL

## 2018-11-03 DIAGNOSIS — F43.9 STRESS: ICD-10-CM

## 2018-11-03 DIAGNOSIS — Z13.6 SCREENING FOR CARDIOVASCULAR CONDITION: ICD-10-CM

## 2018-11-03 DIAGNOSIS — Z13.0 ENCOUNTER FOR SCREENING FOR HEMATOLOGIC DISORDER: ICD-10-CM

## 2018-11-03 DIAGNOSIS — F34.1 DYSTHYMIA: ICD-10-CM

## 2018-11-03 LAB
25(OH)D3 SERPL-MCNC: 21 NG/ML (ref 30–100)
ALBUMIN SERPL BCP-MCNC: 4.7 G/DL (ref 3.2–4.9)
ALBUMIN/GLOB SERPL: 1.9 G/DL
ALP SERPL-CCNC: 58 U/L (ref 30–99)
ALT SERPL-CCNC: 14 U/L (ref 2–50)
ANION GAP SERPL CALC-SCNC: 8 MMOL/L (ref 0–11.9)
AST SERPL-CCNC: 13 U/L (ref 12–45)
BASOPHILS # BLD AUTO: 0.4 % (ref 0–1.8)
BASOPHILS # BLD: 0.04 K/UL (ref 0–0.12)
BILIRUB SERPL-MCNC: 0.5 MG/DL (ref 0.1–1.5)
BUN SERPL-MCNC: 13 MG/DL (ref 8–22)
CALCIUM SERPL-MCNC: 9.4 MG/DL (ref 8.5–10.5)
CHLORIDE SERPL-SCNC: 105 MMOL/L (ref 96–112)
CHOLEST SERPL-MCNC: 153 MG/DL (ref 100–199)
CO2 SERPL-SCNC: 25 MMOL/L (ref 20–33)
CREAT SERPL-MCNC: 0.8 MG/DL (ref 0.5–1.4)
EOSINOPHIL # BLD AUTO: 0.26 K/UL (ref 0–0.51)
EOSINOPHIL NFR BLD: 2.8 % (ref 0–6.9)
ERYTHROCYTE [DISTWIDTH] IN BLOOD BY AUTOMATED COUNT: 45.3 FL (ref 35.9–50)
FERRITIN SERPL-MCNC: 37.8 NG/ML (ref 10–291)
GLOBULIN SER CALC-MCNC: 2.5 G/DL (ref 1.9–3.5)
GLUCOSE SERPL-MCNC: 97 MG/DL (ref 65–99)
HCT VFR BLD AUTO: 45 % (ref 37–47)
HDLC SERPL-MCNC: 54 MG/DL
HGB BLD-MCNC: 13.8 G/DL (ref 12–16)
IMM GRANULOCYTES # BLD AUTO: 0.02 K/UL (ref 0–0.11)
IMM GRANULOCYTES NFR BLD AUTO: 0.2 % (ref 0–0.9)
LDLC SERPL CALC-MCNC: 86 MG/DL
LYMPHOCYTES # BLD AUTO: 2.37 K/UL (ref 1–4.8)
LYMPHOCYTES NFR BLD: 25.5 % (ref 22–41)
MCH RBC QN AUTO: 26.3 PG (ref 27–33)
MCHC RBC AUTO-ENTMCNC: 30.7 G/DL (ref 33.6–35)
MCV RBC AUTO: 85.9 FL (ref 81.4–97.8)
MONOCYTES # BLD AUTO: 0.83 K/UL (ref 0–0.85)
MONOCYTES NFR BLD AUTO: 8.9 % (ref 0–13.4)
NEUTROPHILS # BLD AUTO: 5.77 K/UL (ref 2–7.15)
NEUTROPHILS NFR BLD: 62.2 % (ref 44–72)
NRBC # BLD AUTO: 0 K/UL
NRBC BLD-RTO: 0 /100 WBC
PLATELET # BLD AUTO: 282 K/UL (ref 164–446)
PMV BLD AUTO: 9.5 FL (ref 9–12.9)
POTASSIUM SERPL-SCNC: 4.2 MMOL/L (ref 3.6–5.5)
PROT SERPL-MCNC: 7.2 G/DL (ref 6–8.2)
RBC # BLD AUTO: 5.24 M/UL (ref 4.2–5.4)
SODIUM SERPL-SCNC: 138 MMOL/L (ref 135–145)
TRIGL SERPL-MCNC: 63 MG/DL (ref 0–149)
TSH SERPL DL<=0.005 MIU/L-ACNC: 1.84 UIU/ML (ref 0.38–5.33)
VIT B12 SERPL-MCNC: 235 PG/ML (ref 211–911)
WBC # BLD AUTO: 9.3 K/UL (ref 4.8–10.8)

## 2018-11-03 PROCEDURE — 85025 COMPLETE CBC W/AUTO DIFF WBC: CPT

## 2018-11-03 PROCEDURE — 82306 VITAMIN D 25 HYDROXY: CPT

## 2018-11-03 PROCEDURE — 80061 LIPID PANEL: CPT

## 2018-11-03 PROCEDURE — 82728 ASSAY OF FERRITIN: CPT

## 2018-11-03 PROCEDURE — 80053 COMPREHEN METABOLIC PANEL: CPT

## 2018-11-03 PROCEDURE — 82607 VITAMIN B-12: CPT

## 2018-11-03 PROCEDURE — 36415 COLL VENOUS BLD VENIPUNCTURE: CPT

## 2018-11-03 PROCEDURE — 84443 ASSAY THYROID STIM HORMONE: CPT

## 2018-11-27 ENCOUNTER — OFFICE VISIT (OUTPATIENT)
Dept: MEDICAL GROUP | Facility: PHYSICIAN GROUP | Age: 33
End: 2018-11-27
Payer: COMMERCIAL

## 2018-11-27 VITALS
BODY MASS INDEX: 44.42 KG/M2 | OXYGEN SATURATION: 98 % | SYSTOLIC BLOOD PRESSURE: 122 MMHG | RESPIRATION RATE: 16 BRPM | TEMPERATURE: 97.1 F | HEIGHT: 67 IN | DIASTOLIC BLOOD PRESSURE: 80 MMHG | HEART RATE: 82 BPM | WEIGHT: 283 LBS

## 2018-11-27 DIAGNOSIS — E55.9 VITAMIN D DEFICIENCY: ICD-10-CM

## 2018-11-27 DIAGNOSIS — F34.1 DYSTHYMIA: ICD-10-CM

## 2018-11-27 PROCEDURE — 99214 OFFICE O/P EST MOD 30 MIN: CPT | Performed by: NURSE PRACTITIONER

## 2018-11-27 NOTE — PATIENT INSTRUCTIONS
Continue with vitamin D supplement, can add a MVI that contains iron, can also consider taking OTC B12    Follow up in 4-6 months, sooner if needed, labs before visit    Iron Deficiency Anemia, Adult  Iron deficiency anemia is a condition in which the concentration of red blood cells or hemoglobin in the blood is below normal because of too little iron. Hemoglobin is a substance in red blood cells that carries oxygen to the body's tissues. When the concentration of red blood cells or hemoglobin is too low, not enough oxygen reaches these tissues.  Iron deficiency anemia is usually long-lasting (chronic) and it develops over time. It may or may not cause symptoms. It is a common type of anemia.  What are the causes?  This condition may be caused by:  · Not enough iron in the diet.  · Blood loss caused by bleeding in the intestine.  · Blood loss from a gastrointestinal condition like Crohn disease.  · Frequent blood draws, such as from blood donation.  · Abnormal absorption in the gut.  · Heavy menstrual periods in women.  · Cancers of the gastrointestinal system, such as colon cancer.  What are the signs or symptoms?  Symptoms of this condition may include:  · Fatigue.  · Headache.  · Pale skin, lips, and nail beds.  · Poor appetite.  · Weakness.  · Shortness of breath.  · Dizziness.  · Cold hands and feet.  · Fast or irregular heartbeat.  · Irritability. This is more common in severe anemia.  · Rapid breathing. This is more common in severe anemia.  Mild anemia may not cause any symptoms.  How is this diagnosed?  This condition is diagnosed based on:  · Your medical history.  · A physical exam.  · Blood tests.  You may have additional tests to find the underlying cause of your anemia, such as:  · Testing for blood in the stool (fecal occult blood test).  · A procedure to see inside your colon and rectum (colonoscopy).  · A procedure to see inside your esophagus and stomach (endoscopy).  · A test in which cells are  removed from bone marrow (bone marrow aspiration) or fluid is removed from the bone marrow to be examined (biopsy). This is rarely needed.  How is this treated?  This condition is treated by correcting the cause of your iron deficiency. Treatment may involve:  · Adding iron-rich foods to your diet.  · Taking iron supplements. If you are pregnant or breastfeeding, you may need to take extra iron because your normal diet usually does not provide the amount of iron that you need.  · Increasing vitamin C intake. Vitamin C helps your body absorb iron. Your health care provider may recommend that you take iron supplements along with a glass of orange juice or a vitamin C supplement.  · Medicines to make heavy menstrual flow lighter.  · Surgery.  You may need repeat blood tests to determine whether treatment is working. Depending on the underlying cause, the anemia should be corrected within 2 months of starting treatment. If the treatment does not seem to be working, you may need more testing.  Follow these instructions at home:  Medicines  · Take over-the-counter and prescription medicines only as told by your health care provider. This includes iron supplements and vitamins.  · If you cannot tolerate taking iron supplements by mouth, talk with your health care provider about taking them through a vein (intravenously) or an injection into a muscle.  · For the best iron absorption, you should take iron supplements when your stomach is empty. If you cannot tolerate them on an empty stomach, you may need to take them with food.  · Do not drink milk or take antacids at the same time as your iron supplements. Milk and antacids may interfere with iron absorption.  · Iron supplements can cause constipation. To prevent constipation, include fiber in your diet as told by your health care provider. A stool softener may also be recommended.  Eating and drinking  · Talk with your health care provider before changing your diet. He or  she may recommend that you eat foods that contain a lot of iron, such as:  ¨ Liver.  ¨ Low-fat (lean) beef.  ¨ Breads and cereals that have iron added to them (are fortified).  ¨ Eggs.  ¨ Dried fruit.  ¨ Dark green, leafy vegetables.  · To help your body use the iron from iron-rich foods, eat those foods at the same time as fresh fruits and vegetables that are high in vitamin C. Foods that are high in vitamin C include:  ¨ Oranges.  ¨ Peppers.  ¨ Tomatoes.  ¨ Mangoes.  · Drink enough fluid to keep your urine clear or pale yellow.  General instructions  · Return to your normal activities as told by your health care provider. Ask your health care provider what activities are safe for you.  · Practice good hygiene. Anemia can make you more prone to illness and infection.  · Keep all follow-up visits as told by your health care provider. This is important.  Contact a health care provider if:  · You feel nauseous or you vomit.  · You feel weak.  · You have unexplained sweating.  · You develop symptoms of constipation, such as:  ¨ Having fewer than three bowel movements a week.  ¨ Straining to have a bowel movement.  ¨ Having stools that are hard, dry, or larger than normal.  ¨ Feeling full or bloated.  ¨ Pain in the lower abdomen.  ¨ Not feeling relief after having a bowel movement.  Get help right away if:  · You faint. If this happens, do not drive yourself to the hospital. Call your local emergency services (911 in the [place]  · You have chest pain.  · You have shortness of breath that:  ¨ Is severe.  ¨ Gets worse with physical activity.  · You have a rapid heartbeat.  · You become light-headed when getting up from a sitting or lying down position.  This information is not intended to replace advice given to you by your health care provider. Make sure you discuss any questions you have with your health care provider.  Document Released: 12/15/2001 Document Revised: 09/06/2017 Document Reviewed: 09/06/2017  [employer]  Interactive Patient Education © 2017 [employer]

## 2018-11-27 NOTE — PROGRESS NOTES
Chief Complaint   Patient presents with   • Stress     fv labs         This is a 33 y.o.female patient that presents today with the following:Follow-up, review labs    Dysthymia  Patient continues to struggle with symptoms of depression and anxiety but still hesitant to start medication.  We did order labs at her last visit to assess for B12  vitamin D deficiencies, both both were normal, albeit in the low end of normal.  Thyroid was checked as well as iron deficiency, both tests were negative and normal, again the iron was on the low end of normal.  I did advised her that it would be okay if she started a multivitamin that was iron fortified as well as an over-the-counter B12.  She is already on vitamin D supplementation and we will plan on rechecking labs again in 4-6 months.  At her last visit with me in late September she was referred to behavioral health, she still has not made an appointment but would like to wait until she sees behavioral health before deciding whether or not she wants to start on medication.  She does continue to deny suicidal and homicidal ideations, hallucinations, racing thoughts and flights of ideas.    Vitamin D deficiency  Patient has vitamin D level of 21, she has been started on over-the-counter vitamin D supplementation.  We will plan on rechecking this again in 4-6 months before her follow-up appointment with me.      Hospital Outpatient Visit on 11/03/2018   Component Date Value   • WBC 11/03/2018 9.3    • RBC 11/03/2018 5.24    • Hemoglobin 11/03/2018 13.8    • Hematocrit 11/03/2018 45.0    • MCV 11/03/2018 85.9    • MCH 11/03/2018 26.3*   • MCHC 11/03/2018 30.7*   • RDW 11/03/2018 45.3    • Platelet Count 11/03/2018 282    • MPV 11/03/2018 9.5    • Neutrophils-Polys 11/03/2018 62.20    • Lymphocytes 11/03/2018 25.50    • Monocytes 11/03/2018 8.90    • Eosinophils 11/03/2018 2.80    • Basophils 11/03/2018 0.40    • Immature Granulocytes 11/03/2018 0.20    • Nucleated RBC  11/03/2018 0.00    • Neutrophils (Absolute) 11/03/2018 5.77    • Lymphs (Absolute) 11/03/2018 2.37    • Monos (Absolute) 11/03/2018 0.83    • Eos (Absolute) 11/03/2018 0.26    • Baso (Absolute) 11/03/2018 0.04    • Immature Granulocytes (a* 11/03/2018 0.02    • NRBC (Absolute) 11/03/2018 0.00    • Sodium 11/03/2018 138    • Potassium 11/03/2018 4.2    • Chloride 11/03/2018 105    • Co2 11/03/2018 25    • Anion Gap 11/03/2018 8.0    • Glucose 11/03/2018 97    • Bun 11/03/2018 13    • Creatinine 11/03/2018 0.80    • Calcium 11/03/2018 9.4    • AST(SGOT) 11/03/2018 13    • ALT(SGPT) 11/03/2018 14    • Alkaline Phosphatase 11/03/2018 58    • Total Bilirubin 11/03/2018 0.5    • Albumin 11/03/2018 4.7    • Total Protein 11/03/2018 7.2    • Globulin 11/03/2018 2.5    • A-G Ratio 11/03/2018 1.9    • Ferritin 11/03/2018 37.8    • TSH 11/03/2018 1.840    • Cholesterol,Tot 11/03/2018 153    • Triglycerides 11/03/2018 63    • HDL 11/03/2018 54    • LDL 11/03/2018 86    • Vitamin B12 -True Cobala* 11/03/2018 235    • 25-Hydroxy   Vitamin D 25 11/03/2018 21*   • GFR If  11/03/2018 >60    • GFR If Non  Ameri* 11/03/2018 >60          clinical course has been stable    No past medical history on file.    Past Surgical History:   Procedure Laterality Date   • OTHER      none reported       No family history on file.    Patient has no known allergies.    No current [facility]-ordered outpatient prescriptions on file.     No current [facility]-ordered facility-administered medications on file.        Constitutional ROS: No unexpected change in weight, No weakness, No unexplained fevers, sweats, or chills  Pulmonary ROS: No chronic cough, sputum, or hemoptysis, No shortness of breath, No recent change in breathing  Cardiovascular ROS: No chest pain, No edema, No palpitations  Musculoskeletal/Extremities ROS: No clubbing, No peripheral edema, No pain, redness or swelling on the joints  Neurologic ROS: Normal development, No  "seizures, No weakness  Psych ROS: Positive for symptoms of anxiety and depression   ROS: Positive per HPI    Physical exam:  /80 (BP Location: Right arm, Patient Position: Sitting, BP Cuff Size: Adult long)   Pulse 82   Temp 36.2 °C (97.1 °F) (Temporal)   Resp 16   Ht 1.702 m (5' 7\")   Wt (!) 128.4 kg (283 lb)   SpO2 98%   BMI 44.32 kg/m²   General Appearance: Young female, alert, no distress, morbidly obese, well-groomed  Skin: Skin color, texture, turgor normal. No rashes or lesions.  Lungs: negative findings: normal respiratory rate and rhythm, normal effort  Musculoskeletal: negative findings: no evidence of joint instability, strength normal, no deformities present  Neurologic: intact    Medical decision making/discussion: Patient was advised to continue with vitamin D supplement, discussed with her that she would likely benefit from an over-the-counter multivitamin containing iron as well as an over-the-counter vitamin B12 supplement, as she was on the low end of normal for both of these labs.  She is to follow-up with me in 4-6 months with labs done before visit.  She was given printed education material on iron deficiency anemia.    Say was seen today for stress.    Diagnoses and all orders for this visit:    Dysthymia    Vitamin D deficiency  -     VITAMIN D,25 HYDROXY; Future          Please note that this dictation was created using voice recognition software. I have made every reasonable attempt to correct obvious errors, but I expect that there are errors of grammar and possibly content that I did not discover before finalizing the note.        "

## 2018-11-28 NOTE — ASSESSMENT & PLAN NOTE
Patient has vitamin D level of 21, she has been started on over-the-counter vitamin D supplementation.  We will plan on rechecking this again in 4-6 months before her follow-up appointment with me.

## 2018-11-28 NOTE — ASSESSMENT & PLAN NOTE
Patient continues to struggle with symptoms of depression and anxiety but still hesitant to start medication.  We did order labs at her last visit to assess for B12  vitamin D deficiencies, both both were normal, albeit in the low end of normal.  Thyroid was checked as well as iron deficiency, both tests were negative and normal, again the iron was on the low end of normal.  I did advised her that it would be okay if she started a multivitamin that was iron fortified as well as an over-the-counter B12.  She is already on vitamin D supplementation and we will plan on rechecking labs again in 4-6 months.  At her last visit with me in late September she was referred to behavioral health, she still has not made an appointment but would like to wait until she sees behavioral health before deciding whether or not she wants to start on medication.  She does continue to deny suicidal and homicidal ideations, hallucinations, racing thoughts and flights of ideas.

## 2021-08-30 ENCOUNTER — PATIENT MESSAGE (OUTPATIENT)
Dept: MEDICAL GROUP | Facility: PHYSICIAN GROUP | Age: 36
End: 2021-08-30

## 2021-08-30 NOTE — TELEPHONE ENCOUNTER
From: Say Merrill  To: Nurse Practitioner Maria Dolores Hays  Sent: 8/30/2021 8:14 AM PDT  Subject: Covid19    Dr hays,    My  tested positive for covid over the weekend after being exposed at work. Myself as well as my 3 kids have had symptoms since Friday. I'm wondering if we need to make arrangements to be seen and tested or if we need to self isolate?

## 2021-09-21 ENCOUNTER — HOSPITAL ENCOUNTER (OUTPATIENT)
Facility: MEDICAL CENTER | Age: 36
End: 2021-09-21
Attending: NURSE PRACTITIONER
Payer: COMMERCIAL

## 2021-09-21 ENCOUNTER — OFFICE VISIT (OUTPATIENT)
Dept: MEDICAL GROUP | Facility: PHYSICIAN GROUP | Age: 36
End: 2021-09-21
Payer: COMMERCIAL

## 2021-09-21 VITALS
HEIGHT: 67 IN | TEMPERATURE: 98.2 F | BODY MASS INDEX: 44.42 KG/M2 | HEART RATE: 72 BPM | RESPIRATION RATE: 16 BRPM | WEIGHT: 283 LBS | DIASTOLIC BLOOD PRESSURE: 78 MMHG | OXYGEN SATURATION: 98 % | SYSTOLIC BLOOD PRESSURE: 130 MMHG

## 2021-09-21 DIAGNOSIS — Z11.51 SCREENING FOR HPV (HUMAN PAPILLOMAVIRUS): ICD-10-CM

## 2021-09-21 DIAGNOSIS — Z12.4 SCREENING FOR CERVICAL CANCER: ICD-10-CM

## 2021-09-21 DIAGNOSIS — Z01.419 WELL WOMAN EXAM WITH ROUTINE GYNECOLOGICAL EXAM: ICD-10-CM

## 2021-09-21 PROCEDURE — 99395 PREV VISIT EST AGE 18-39: CPT | Performed by: NURSE PRACTITIONER

## 2021-09-21 PROCEDURE — 87624 HPV HI-RISK TYP POOLED RSLT: CPT

## 2021-09-21 PROCEDURE — 88175 CYTOPATH C/V AUTO FLUID REDO: CPT

## 2021-09-21 ASSESSMENT — PATIENT HEALTH QUESTIONNAIRE - PHQ9
1. LITTLE INTEREST OR PLEASURE IN DOING THINGS: MORE THAN HALF THE DAYS
6. FEELING BAD ABOUT YOURSELF - OR THAT YOU ARE A FAILURE OR HAVE LET YOURSELF OR YOUR FAMILY DOWN: SEVERAL DAYS
7. TROUBLE CONCENTRATING ON THINGS, SUCH AS READING THE NEWSPAPER OR WATCHING TELEVISION: NOT AT ALL
2. FEELING DOWN, DEPRESSED, IRRITABLE, OR HOPELESS: SEVERAL DAYS
8. MOVING OR SPEAKING SO SLOWLY THAT OTHER PEOPLE COULD HAVE NOTICED. OR THE OPPOSITE, BEING SO FIGETY OR RESTLESS THAT YOU HAVE BEEN MOVING AROUND A LOT MORE THAN USUAL: NOT AT ALL
SUM OF ALL RESPONSES TO PHQ QUESTIONS 1-9: 8
SUM OF ALL RESPONSES TO PHQ9 QUESTIONS 1 AND 2: 3
4. FEELING TIRED OR HAVING LITTLE ENERGY: SEVERAL DAYS
5. POOR APPETITE OR OVEREATING: MORE THAN HALF THE DAYS
9. THOUGHTS THAT YOU WOULD BE BETTER OFF DEAD, OR OF HURTING YOURSELF: NOT AT ALL
3. TROUBLE FALLING OR STAYING ASLEEP OR SLEEPING TOO MUCH: SEVERAL DAYS

## 2021-09-21 NOTE — ASSESSMENT & PLAN NOTE
Pt here for well woman exam including PAP/HPV. Her last PAP was 2018 and it was normal. She denies concerns for unusual vaginal discharge, odor, itching, or pain with intercourse. Her menses are irregular for last 3-5 months.   She does not have any concerns with her breasts. She is  Not up to date with her mammogram due to age. She does do monthly self breast exams. She was encouraged to continue with monthly SBEs, instructions were given during exam.

## 2021-09-21 NOTE — PROGRESS NOTES
Chief Complaint   Patient presents with   • Gynecologic Exam       HISTORY OF PRESENT ILLNESS: Patient is a 36 y.o. female established patient who presents today for routine PAP    Well woman exam with routine gynecological exam  Pt here for well woman exam including PAP/HPV. Her last PAP was 2018 and it was normal. She denies concerns for unusual vaginal discharge, odor, itching, or pain with intercourse. Her menses are irregular for last 3-5 months.   She does not have any concerns with her breasts. She is  Not up to date with her mammogram due to age. She does do monthly self breast exams. She was encouraged to continue with monthly SBEs, instructions were given during exam.           Patient Active Problem List    Diagnosis Date Noted   • Well woman exam with routine gynecological exam 09/21/2021   • Vitamin D deficiency 11/27/2018   • Stress 09/19/2018   • Dysthymia 09/19/2018   • Morbid obesity with BMI of 40.0-44.9, adult ([facility]) 09/19/2018   • Indication for care in labor or delivery 10/29/2017   • Positive GBS test 10/18/2017   • Supervision of normal pregnancy in third trimester 05/30/2017       Allergies:Patient has no known allergies.    No current outpatient medications on file.     No current facility-administered medications for this visit.       Social History     Tobacco Use   • Smoking status: Never Smoker   • Smokeless tobacco: Never Used   Substance Use Topics   • Alcohol use: No     Comment: occ   • Drug use: No       Family Status   Relation Name Status   • Mo  Alive   • Fa  Alive   • Sis 2 Alive   No family history on file.    Review of Systems:   Constitutional: Negative for fever, chills, weight loss and malaise/fatigue. .   Respiratory: Negative for cough, sputum production, shortness of breath and wheezing.    Cardiovascular: Negative for chest pain, palpitations, orthopnea and leg swelling.   Gastrointestinal: Negative for heartburn, nausea, vomiting and abdominal pain.  "  Genitourinary/Renal: Negative for dysuria, urgency and frequency.   Musculoskeletal: Negative for myalgias, back pain and joint pain.   Skin: Negative for rash and itching.   Neurological: Negative for dizziness, tingling, tremors, sensory change, focal weakness and headaches.   Endo/Heme/Allergies: Does not bruise/bleed easily.   Psychiatric/Behavioral: Negative for depression, suicidal ideas and memory loss.  The patient is not nervous/anxious and does not have insomnia.    All other systems reviewed and are negative except as in HPI.    Exam:  /78 (BP Location: Right arm, Patient Position: Sitting, BP Cuff Size: Adult long)   Pulse 72   Temp 36.8 °C (98.2 °F) (Temporal)   Resp 16   Ht 1.702 m (5' 7\")   Wt (!) 128 kg (283 lb)   SpO2 98%   General:  Well nourished, well developed female in NAD  Skin: warm, dry, intact, no evidence of rash or concerning lesions  Head: is grossly normal.  HEENT: eyes clear, conjunctiva normal, PERRLA  Pulmonary: Clear to ausculation. Normal effort. No rales, ronchi, or wheezing.  Cardiovascular: Regular rate and rhythm without murmur. Carotid and radial pulses are intact and equal bilaterally.  Abdomen: soft, non-tender, positive bowel sounds  Musculoskeletal: no clubbing, cyanosis, or edema.  Psych/mental: no depression, anxiety, hallucinations  Neuro: alert, intact, CN 2-12 grossly intact  Pelvic: Physical Exam  Genitourinary:      Pelvic exam was performed with patient in the lithotomy position.      Vulva, vagina, cervix, uterus, right adnexa, left adnexa and rectum normal.           Medical decision-making and discussion:  Assessment/Plan:  Say was seen today for gynecologic exam.    Diagnoses and all orders for this visit:    Well woman exam with routine gynecological exam  -     THINPREP PAP WITH HPV; Future    Screening for cervical cancer  -     THINPREP PAP WITH HPV; Future    Screening for HPV (human papillomavirus)  -     THINPREP PAP WITH HPV; " Future           Return in about 1 year (around 9/21/2022) for Wellness / Physical Exam.        Please note that this dictation was created using voice recognition software. I have made every reasonable attempt to correct obvious errors, but I expect that there are errors of grammar and possibly content that I did not discover before finalizing the note.

## 2021-09-22 DIAGNOSIS — Z11.51 SCREENING FOR HPV (HUMAN PAPILLOMAVIRUS): ICD-10-CM

## 2021-09-22 DIAGNOSIS — Z12.4 SCREENING FOR CERVICAL CANCER: ICD-10-CM

## 2021-09-22 DIAGNOSIS — Z01.419 WELL WOMAN EXAM WITH ROUTINE GYNECOLOGICAL EXAM: ICD-10-CM

## 2021-10-14 ENCOUNTER — TELEPHONE (OUTPATIENT)
Dept: MEDICAL GROUP | Facility: PHYSICIAN GROUP | Age: 36
End: 2021-10-14

## 2021-10-14 ENCOUNTER — OFFICE VISIT (OUTPATIENT)
Dept: MEDICAL GROUP | Facility: PHYSICIAN GROUP | Age: 36
End: 2021-10-14
Payer: COMMERCIAL

## 2021-10-14 VITALS
OXYGEN SATURATION: 96 % | WEIGHT: 287.2 LBS | TEMPERATURE: 97.6 F | RESPIRATION RATE: 16 BRPM | HEIGHT: 67 IN | BODY MASS INDEX: 45.08 KG/M2 | HEART RATE: 79 BPM | SYSTOLIC BLOOD PRESSURE: 122 MMHG | DIASTOLIC BLOOD PRESSURE: 82 MMHG

## 2021-10-14 DIAGNOSIS — N75.0 BARTHOLIN GLAND CYST: ICD-10-CM

## 2021-10-14 PROCEDURE — 99214 OFFICE O/P EST MOD 30 MIN: CPT | Mod: 25 | Performed by: FAMILY MEDICINE

## 2021-10-14 PROCEDURE — 10060 I&D ABSCESS SIMPLE/SINGLE: CPT | Performed by: FAMILY MEDICINE

## 2021-10-14 ASSESSMENT — PATIENT HEALTH QUESTIONNAIRE - PHQ9
CLINICAL INTERPRETATION OF PHQ2 SCORE: 5
SUM OF ALL RESPONSES TO PHQ QUESTIONS 1-9: 14
5. POOR APPETITE OR OVEREATING: 1 - SEVERAL DAYS

## 2021-10-14 ASSESSMENT — ANXIETY QUESTIONNAIRES
4. TROUBLE RELAXING: MORE THAN HALF THE DAYS
7. FEELING AFRAID AS IF SOMETHING AWFUL MIGHT HAPPEN: SEVERAL DAYS
1. FEELING NERVOUS, ANXIOUS, OR ON EDGE: MORE THAN HALF THE DAYS
GAD7 TOTAL SCORE: 12
2. NOT BEING ABLE TO STOP OR CONTROL WORRYING: MORE THAN HALF THE DAYS
6. BECOMING EASILY ANNOYED OR IRRITABLE: MORE THAN HALF THE DAYS
5. BEING SO RESTLESS THAT IT IS HARD TO SIT STILL: SEVERAL DAYS
3. WORRYING TOO MUCH ABOUT DIFFERENT THINGS: MORE THAN HALF THE DAYS

## 2021-10-14 NOTE — PROGRESS NOTES
Subjective:   Say Merrill is a 36 y.o. female here today for evaluation and management of:     Bartholin gland cyst  Patient has a second occurrence of a Bartholin cyst gland first time happened about 2-1/2 months ago and current occurrence persistent for about 3 days now and it is causing her pain and discomfort.  On exam there is  an approximately half centimeter smooth round nodule/cyst left inner labia.  Certainly consistent with Bartholin gland cyst.  Assured patient this is not an ulcer or any form of STD.  Differential could include a folliculitis.  We discussed treatment options best being incision and drainage to prevent recurrence.  I do not have a Rubio catheter in any way the lesion is too small to have this inserted.  We also discussed watchful waiting or referral to OB/GYN since it will be a simple procedure patient elects to go forward with this in clinic today procedure    Skin cleaned with iodine and alcohol  Lidocaine without epinephrine injected for local anesthetic  Incision done with a #11 scalpel  Cyst drained and silver nitrate therapy applied  Hemostasis with pressure and silver nitrate  Antibiotic ointment applied  Patient counseled on wound care  She should call me if any worsening bleeding, pain, redness, pus, signs of infection.  Patient tolerated the procedure well             Current medicines (including changes today)  No current outpatient medications on file.     No current facility-administered medications for this visit.     She  has no past medical history of Allergy, unspecified not elsewhere classified, Asthma, Muscle disorder, or Type II or unspecified type diabetes mellitus without mention of complication, not stated as uncontrolled.    ROS  No chest pain, no shortness of breath, no abdominal pain       Objective:     /82 (BP Location: Left arm, Patient Position: Sitting, BP Cuff Size: Large adult)   Pulse 79   Temp 36.4 °C (97.6 °F) (Temporal)   Resp 16   Ht  "1.702 m (5' 7\")   Wt (!) 130 kg (287 lb 3.2 oz)   SpO2 96%  Body mass index is 44.98 kg/m².   Physical Exam:  Constitutional: Alert, no distress.  Skin: Warm, dry, good turgor, no rashes in visible areas.  Eye: Equal, round and reactive, conjunctiva clear, lids normal.  ENMT: Lips without lesions, good dentition, oropharynx clear.  Neck: Trachea midline, no masses, no thyromegaly. No cervical or supraclavicular lymphadenopathy  Respiratory: Unlabored respiratory effort, lungs clear to auscultation, no wheezes, no ronchi.  Cardiovascular: Normal S1, S2, no murmur, no edema.  Abdomen: Soft, non-tender, no masses, no hepatosplenomegaly.  Psych: Alert and oriented x3, normal affect and mood.  Pelvic: 0.5 cm cyst on left labia minora      Assessment and Plan:   The following treatment plan was discussed    1. Bartholin gland cyst  I&D performed in clinic  - Consent for all Surgical, Special Diagnostic or Therapeutic Procedures      Followup: Return for as needed.         "

## 2021-10-14 NOTE — TELEPHONE ENCOUNTER
Hello,   I realized after the patient left clinic today that she had a depression screen positive for self harm risk.   Can we please make her a follow up appointment with me this month to review this?   Thank you  Deonte Bolivar M.D.

## 2021-10-14 NOTE — ASSESSMENT & PLAN NOTE
Patient has a second occurrence of a Bartholin cyst gland first time happened about 2-1/2 months ago and current occurrence persistent for about 3 days now and it is causing her pain and discomfort.  On exam there is  an approximately half centimeter smooth round nodule/cyst left inner labia.  Certainly consistent with Bartholin gland cyst.  Assured patient this is not an ulcer or any form of STD.  Differential could include a folliculitis.  We discussed treatment options best being incision and drainage to prevent recurrence.  I do not have a Rubio catheter in any way the lesion is too small to have this inserted.  We also discussed watchful waiting or referral to OB/GYN since it will be a simple procedure patient elects to go forward with this in clinic today procedure    Skin cleaned with iodine and alcohol  Lidocaine without epinephrine injected for local anesthetic  Incision done with a #11 scalpel  Cyst drained pus, irrigation done and silver nitrate therapy applied  Hemostasis with pressure and silver nitrate  Antibiotic ointment applied  Patient counseled on wound care  She should call me if any worsening bleeding, pain, redness, pus, signs of infection.  Patient tolerated the procedure well

## 2021-10-14 NOTE — TELEPHONE ENCOUNTER
Called patient and spoke with her.  Incision is feeling fine and not too sore.  I reviewed with her the positive depression screen with self-harm, she says she does have some depression symptoms and occasional thoughts of self-harm but no active plan.  Occasional fleeting thoughts of suicide but no definite plan to harm herself.  Her  does have firearms in the home but this is under lock and key he has the keys and she does not access this.  She denies any physical emotional or sexual abuse.  I told her that my medical assistant or nurse will reach out to her to make her an appointment in the next few weeks and she said this would be a good idea to help treat her depression.  Deonte Bolivar M.D.

## 2021-11-16 ENCOUNTER — OFFICE VISIT (OUTPATIENT)
Dept: MEDICAL GROUP | Facility: PHYSICIAN GROUP | Age: 36
End: 2021-11-16
Payer: COMMERCIAL

## 2021-11-16 VITALS
WEIGHT: 285 LBS | TEMPERATURE: 97.9 F | DIASTOLIC BLOOD PRESSURE: 90 MMHG | HEART RATE: 77 BPM | HEIGHT: 67 IN | OXYGEN SATURATION: 96 % | SYSTOLIC BLOOD PRESSURE: 118 MMHG | BODY MASS INDEX: 44.73 KG/M2 | RESPIRATION RATE: 12 BRPM

## 2021-11-16 DIAGNOSIS — E55.9 VITAMIN D DEFICIENCY: ICD-10-CM

## 2021-11-16 DIAGNOSIS — N75.0 BARTHOLIN GLAND CYST: ICD-10-CM

## 2021-11-16 DIAGNOSIS — F33.1 MODERATE EPISODE OF RECURRENT MAJOR DEPRESSIVE DISORDER (HCC): ICD-10-CM

## 2021-11-16 DIAGNOSIS — Z80.0 FAMILY HISTORY OF COLON CANCER: ICD-10-CM

## 2021-11-16 PROBLEM — Z34.93 SUPERVISION OF NORMAL PREGNANCY IN THIRD TRIMESTER: Status: RESOLVED | Noted: 2017-05-30 | Resolved: 2021-11-16

## 2021-11-16 PROBLEM — B95.1 POSITIVE GBS TEST: Status: RESOLVED | Noted: 2017-10-18 | Resolved: 2021-11-16

## 2021-11-16 PROCEDURE — 99214 OFFICE O/P EST MOD 30 MIN: CPT | Performed by: FAMILY MEDICINE

## 2021-11-16 RX ORDER — ESCITALOPRAM OXALATE 10 MG/1
10 TABLET ORAL DAILY
Qty: 30 TABLET | Refills: 5 | Status: SHIPPED | OUTPATIENT
Start: 2021-11-16 | End: 2022-04-28 | Stop reason: SDUPTHER

## 2021-11-17 NOTE — ASSESSMENT & PLAN NOTE
Patient has a few years of depression symptoms of      is on call all the time for the [employer], his work away from home is also hard on her as she is caring for the 3 kids ages 4, 6, 13    Had normal labs Vit D, B12, iron

## 2021-11-17 NOTE — PROGRESS NOTES
"Subjective:   Say Merrill is a 36 y.o. female here today for evaluation and management of:     Bartholin gland cyst  Recurred in a different area, patient had pain and swelling but with use of sitz baths now no pain and cyst has shrunk.   Will continue conservative management.     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 Patient's parents don't have colon cancer.   I advised her to get screening started at age 45 or sooner if any changes in bowel habits or blood in the stool.            Current medicines (including changes today)  Current Outpatient Medications   Medication Sig Dispense Refill   • escitalopram (LEXAPRO) 10 MG Tab Take 1 Tablet by mouth every day. 30 Tablet 5     No current facility-administered medications for this visit.     She  has no past medical history of Allergy, unspecified not elsewhere classified, Asthma, Muscle disorder, or Type II or unspecified type diabetes mellitus without mention of complication, not stated as uncontrolled.    ROS  No chest pain, no shortness of breath, no abdominal pain       Objective:     /90   Pulse 77   Temp 36.6 °C (97.9 °F) (Temporal)   Resp 12   Ht 1.708 m (5' 7.25\")   Wt (!) 129 kg (285 lb)   SpO2 96%  Body mass index is 44.31 kg/m².   Physical Exam:  Constitutional: Alert, no distress.  Skin: Warm, dry, good turgor, no rashes in visible areas.  Eye: Equal, round and reactive, conjunctiva clear, lids normal.  ENMT: Lips without lesions, good dentition, oropharynx clear.  Neck: Trachea midline, no masses, no thyromegaly. No cervical or supraclavicular lymphadenopathy  Respiratory: Unlabored respiratory effort, lungs " "clear to auscultation, no wheezes, no ronchi.  Cardiovascular: Normal S1, S2, no murmur, no edema.  Abdomen: Soft, non-tender, no masses, no hepatosplenomegaly.  Psych: Alert and oriented x3, normal affect and mood.        Assessment and Plan:   The following treatment plan was discussed    1. Bartholin gland cyst      2. Moderate episode of recurrent major depressive disorder (HCC)  Subjective:   Say Merrill is a 36 y.o. female here today for evaluation and management of:     Bartholin gland cyst  Recurred in a different area, patient had pain and swelling but with use of sitz baths now no pain and cyst has shrunk.   Will continue conservative management.     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 Patient's parents don't have colon cancer.   I advised her to get screening started at age 45 or sooner if any changes in bowel habits or blood in the stool.            Current medicines (including changes today)  Current Outpatient Medications   Medication Sig Dispense Refill   • escitalopram (LEXAPRO) 10 MG Tab Take 1 Tablet by mouth every day. 30 Tablet 5     No current facility-administered medications for this visit.     She  has no past medical history of Allergy, unspecified not elsewhere classified, Asthma, Muscle disorder, or Type II or unspecified type diabetes mellitus without mention of complication, not stated as uncontrolled.    ROS  No chest pain, no shortness of breath, no abdominal pain       Objective:     /90   Pulse 77   Temp 36.6 °C (97.9 °F) (Temporal)   Resp 12   Ht 1.708 m (5' 7.25\")   Wt (!) 129 kg (285 lb)   SpO2 96%  Body mass index is 44.31 kg/m². "   Physical Exam:  Constitutional: Alert, no distress.  Skin: Warm, dry, good turgor, no rashes in visible areas.  Eye: Equal, round and reactive, conjunctiva clear, lids normal.  ENMT: Lips without lesions, good dentition, oropharynx clear.  Neck: Trachea midline, no masses, no thyromegaly. No cervical or supraclavicular lymphadenopathy  Respiratory: Unlabored respiratory effort, lungs clear to auscultation, no wheezes, no ronchi.  Cardiovascular: Normal S1, S2, no murmur, no edema.  Abdomen: Soft, non-tender, no masses, no hepatosplenomegaly.  Psych: Alert and oriented x3, normal affect and mood.        Assessment and Plan:   The following treatment plan was discussed    1. Bartholin gland cyst      2. Moderate episode of recurrent major depressive disorder (HCC)    - escitalopram (LEXAPRO) 10 MG Tab; Take 1 Tablet by mouth every day.  Dispense: 30 Tablet; Refill: 5  - Referral to Psychology  - CBC WITH DIFFERENTIAL; Future  - Comp Metabolic Panel; Future    3. Vitamin D deficiency    - VITAMIN D,25 HYDROXY; Future      Followup: Return in about 3 months (around 2/16/2022).         - escitalopram (LEXAPRO) 10 MG Tab; Take 1 Tablet by mouth every day.  Dispense: 30 Tablet; Refill: 5  - Referral to Psychology      Followup: Return in about 3 months (around 2/16/2022).

## 2021-11-17 NOTE — ASSESSMENT & PLAN NOTE
Paternal grandfather had colon cancer. Patient's parents don't have colon cancer.   I advised her to get screening started at age 45 or sooner if any changes in bowel habits or blood in the stool.

## 2021-11-17 NOTE — ASSESSMENT & PLAN NOTE
Recurred in a different area, patient had pain and swelling but with use of sitz baths now no pain and cyst has shrunk.   Will continue conservative management.

## 2021-11-24 ENCOUNTER — TELEMEDICINE (OUTPATIENT)
Dept: MEDICAL GROUP | Facility: PHYSICIAN GROUP | Age: 36
End: 2021-11-24
Payer: COMMERCIAL

## 2021-11-24 VITALS — BODY MASS INDEX: 44.73 KG/M2 | HEIGHT: 67 IN | WEIGHT: 285 LBS

## 2021-11-24 DIAGNOSIS — F43.9 STRESS: ICD-10-CM

## 2021-11-24 DIAGNOSIS — F33.1 MODERATE EPISODE OF RECURRENT MAJOR DEPRESSIVE DISORDER (HCC): ICD-10-CM

## 2021-11-24 PROCEDURE — 99212 OFFICE O/P EST SF 10 MIN: CPT | Mod: 95 | Performed by: NURSE PRACTITIONER

## 2021-11-24 NOTE — ASSESSMENT & PLAN NOTE
Very pleasant 36-year-old female patient presents today via virtual visit platform to have FMLA paperwork filled out on behalf of her  who will assist her during exacerbations of conditions including a depression and stress  Document filled out, see scanned into media  Patient has been referred to behavioral health and is currently on escitalopram  She is advised to follow-up at already scheduled appointments and make appointment with behavioral health as soon as possible

## 2022-02-08 ENCOUNTER — HOSPITAL ENCOUNTER (OUTPATIENT)
Dept: LAB | Facility: MEDICAL CENTER | Age: 37
End: 2022-02-08
Attending: FAMILY MEDICINE
Payer: COMMERCIAL

## 2022-02-08 DIAGNOSIS — E55.9 VITAMIN D DEFICIENCY: ICD-10-CM

## 2022-02-08 DIAGNOSIS — F33.1 MODERATE EPISODE OF RECURRENT MAJOR DEPRESSIVE DISORDER (HCC): ICD-10-CM

## 2022-02-08 LAB
ALBUMIN SERPL BCP-MCNC: 4.2 G/DL (ref 3.2–4.9)
ALBUMIN/GLOB SERPL: 1.6 G/DL
ALP SERPL-CCNC: 62 U/L (ref 30–99)
ALT SERPL-CCNC: 15 U/L (ref 2–50)
ANION GAP SERPL CALC-SCNC: 10 MMOL/L (ref 7–16)
AST SERPL-CCNC: 12 U/L (ref 12–45)
BASOPHILS # BLD AUTO: 0.4 % (ref 0–1.8)
BASOPHILS # BLD: 0.03 K/UL (ref 0–0.12)
BILIRUB SERPL-MCNC: 0.4 MG/DL (ref 0.1–1.5)
BUN SERPL-MCNC: 11 MG/DL (ref 8–22)
CALCIUM SERPL-MCNC: 9.1 MG/DL (ref 8.5–10.5)
CHLORIDE SERPL-SCNC: 105 MMOL/L (ref 96–112)
CO2 SERPL-SCNC: 25 MMOL/L (ref 20–33)
CREAT SERPL-MCNC: 0.65 MG/DL (ref 0.5–1.4)
EOSINOPHIL # BLD AUTO: 0.2 K/UL (ref 0–0.51)
EOSINOPHIL NFR BLD: 2.4 % (ref 0–6.9)
ERYTHROCYTE [DISTWIDTH] IN BLOOD BY AUTOMATED COUNT: 43.4 FL (ref 35.9–50)
GLOBULIN SER CALC-MCNC: 2.6 G/DL (ref 1.9–3.5)
GLUCOSE SERPL-MCNC: 94 MG/DL (ref 65–99)
HCT VFR BLD AUTO: 40 % (ref 37–47)
HGB BLD-MCNC: 12.7 G/DL (ref 12–16)
IMM GRANULOCYTES # BLD AUTO: 0.02 K/UL (ref 0–0.11)
IMM GRANULOCYTES NFR BLD AUTO: 0.2 % (ref 0–0.9)
LYMPHOCYTES # BLD AUTO: 2.22 K/UL (ref 1–4.8)
LYMPHOCYTES NFR BLD: 26.7 % (ref 22–41)
MCH RBC QN AUTO: 26.8 PG (ref 27–33)
MCHC RBC AUTO-ENTMCNC: 31.8 G/DL (ref 33.6–35)
MCV RBC AUTO: 84.4 FL (ref 81.4–97.8)
MONOCYTES # BLD AUTO: 0.77 K/UL (ref 0–0.85)
MONOCYTES NFR BLD AUTO: 9.3 % (ref 0–13.4)
NEUTROPHILS # BLD AUTO: 5.07 K/UL (ref 2–7.15)
NEUTROPHILS NFR BLD: 61 % (ref 44–72)
NRBC # BLD AUTO: 0 K/UL
NRBC BLD-RTO: 0 /100 WBC
PLATELET # BLD AUTO: 317 K/UL (ref 164–446)
PMV BLD AUTO: 9.4 FL (ref 9–12.9)
POTASSIUM SERPL-SCNC: 4.3 MMOL/L (ref 3.6–5.5)
PROT SERPL-MCNC: 6.8 G/DL (ref 6–8.2)
RBC # BLD AUTO: 4.74 M/UL (ref 4.2–5.4)
SODIUM SERPL-SCNC: 140 MMOL/L (ref 135–145)
WBC # BLD AUTO: 8.3 K/UL (ref 4.8–10.8)

## 2022-02-08 PROCEDURE — 82306 VITAMIN D 25 HYDROXY: CPT

## 2022-02-08 PROCEDURE — 85025 COMPLETE CBC W/AUTO DIFF WBC: CPT

## 2022-02-08 PROCEDURE — 80053 COMPREHEN METABOLIC PANEL: CPT

## 2022-02-08 PROCEDURE — 36415 COLL VENOUS BLD VENIPUNCTURE: CPT

## 2022-02-09 LAB — 25(OH)D3 SERPL-MCNC: 19 NG/ML (ref 30–100)

## 2022-02-16 DIAGNOSIS — D64.9 ANEMIA, UNSPECIFIED TYPE: ICD-10-CM

## 2022-02-16 RX ORDER — ERGOCALCIFEROL 1.25 MG/1
50000 CAPSULE ORAL
Qty: 7 CAPSULE | Refills: 0 | Status: SHIPPED | OUTPATIENT
Start: 2022-02-16 | End: 2022-03-31

## 2022-02-17 RX ORDER — ERGOCALCIFEROL 1.25 MG/1
50000 CAPSULE ORAL
Qty: 7 CAPSULE | Refills: 0 | Status: SHIPPED | OUTPATIENT
Start: 2022-02-17 | End: 2022-04-01

## 2022-02-17 NOTE — RESULT ENCOUNTER NOTE
Released to aleja Deal,  Your labs show very low vitamin D I've sent a once a week high dose vitamin D booster dose to [facility]. After 7 weeks continue on over the counter 5000 units of vitamin D daily.   You may have a mild iron deficiency. Try to include plenty of iron rich foods in diet: lean meat like fish and chicken, dark green leafy vegetables. Take vitamin C to help with iron absorption.   All your other labs are normal.   Please make a follow up with me in 6-8 months regarding vitamin D and iron.   Deonte Bolivar M.D.

## 2022-04-28 DIAGNOSIS — F33.1 MODERATE EPISODE OF RECURRENT MAJOR DEPRESSIVE DISORDER (HCC): ICD-10-CM

## 2022-04-28 RX ORDER — ESCITALOPRAM OXALATE 10 MG/1
10 TABLET ORAL DAILY
Qty: 30 TABLET | Refills: 5 | Status: SHIPPED | OUTPATIENT
Start: 2022-04-28 | End: 2022-05-20 | Stop reason: SDUPTHER

## 2022-04-28 RX ORDER — CLOBETASOL PROPIONATE 0.5 MG/G
OINTMENT TOPICAL
Qty: 15 G | Refills: 1 | Status: SHIPPED | OUTPATIENT
Start: 2022-04-28 | End: 2022-07-21

## 2022-04-28 RX ORDER — VALACYCLOVIR HYDROCHLORIDE 500 MG/1
500 TABLET, FILM COATED ORAL 3 TIMES DAILY
Qty: 30 TABLET | Refills: 0 | Status: SHIPPED | OUTPATIENT
Start: 2022-04-28 | End: 2022-05-08

## 2022-05-20 ENCOUNTER — OFFICE VISIT (OUTPATIENT)
Dept: MEDICAL GROUP | Facility: PHYSICIAN GROUP | Age: 37
End: 2022-05-20
Payer: COMMERCIAL

## 2022-05-20 VITALS
BODY MASS INDEX: 45.27 KG/M2 | TEMPERATURE: 97 F | HEIGHT: 67 IN | DIASTOLIC BLOOD PRESSURE: 78 MMHG | OXYGEN SATURATION: 96 % | SYSTOLIC BLOOD PRESSURE: 114 MMHG | HEART RATE: 78 BPM | RESPIRATION RATE: 16 BRPM | WEIGHT: 288.4 LBS

## 2022-05-20 DIAGNOSIS — E66.01 MORBID OBESITY WITH BMI OF 45.0-49.9, ADULT (HCC): ICD-10-CM

## 2022-05-20 DIAGNOSIS — R21 RASH: ICD-10-CM

## 2022-05-20 DIAGNOSIS — E55.9 VITAMIN D DEFICIENCY: ICD-10-CM

## 2022-05-20 DIAGNOSIS — F33.1 MODERATE EPISODE OF RECURRENT MAJOR DEPRESSIVE DISORDER (HCC): ICD-10-CM

## 2022-05-20 PROCEDURE — 99214 OFFICE O/P EST MOD 30 MIN: CPT | Performed by: FAMILY MEDICINE

## 2022-05-20 RX ORDER — ESCITALOPRAM OXALATE 10 MG/1
10 TABLET ORAL DAILY
Qty: 90 TABLET | Refills: 3 | Status: SHIPPED | OUTPATIENT
Start: 2022-05-20 | End: 2022-10-13

## 2022-05-20 RX ORDER — TRIAMCINOLONE ACETONIDE 1 MG/G
CREAM TOPICAL
Qty: 28.4 G | Refills: 1 | Status: SHIPPED | OUTPATIENT
Start: 2022-05-20 | End: 2022-07-21

## 2022-05-20 ASSESSMENT — FIBROSIS 4 INDEX: FIB4 SCORE: 0.35

## 2022-05-20 ASSESSMENT — PATIENT HEALTH QUESTIONNAIRE - PHQ9: CLINICAL INTERPRETATION OF PHQ2 SCORE: 0

## 2022-05-20 NOTE — PROGRESS NOTES
"Subjective:   Say Merrill is a 37 y.o. female here today for evaluation and management of:     Rash  Blistering painful, fluid filled small blisters with form a callus when healing. Heals over a week to 10 days. Located on tips of fingers.   Not improved with clobetasol which she has been using for a week.  Will try rx for triamcinolone instead.   She has no other rashes except mild allergic rash with sunscreen in summer, no joint pain, diarrhea, trouble swallowing.   She has no family history of rheumatoid or connective tissue or autoimmune disorder.   No new skin or cleaning products that have triggered the rash     Advised protect from water, use cool water, use emolient liberally, frequently  Triamcinolone instead of clobetasol    If not improving, ref to derm provided.   May need to check esr, crp, other labs to further evaluate.       Vitamin D deficiency  Improved alertness and concentration with vit d supplementation.     Moderate episode of recurrent major depressive disorder (HCC)  Good improvement with lexapro 10 mg.   Less depression more alertness, less fatigue.          Current medicines (including changes today)  Current Outpatient Medications   Medication Sig Dispense Refill   • escitalopram (LEXAPRO) 10 MG Tab Take 1 Tablet by mouth every day. 90 Tablet 3   • clobetasol (TEMOVATE) 0.05 % Ointment Apply at thin film to affected skin twice a day. 15 g 1     No current facility-administered medications for this visit.     She  has no past medical history of Allergy, unspecified not elsewhere classified, Asthma, Muscle disorder, or Type II or unspecified type diabetes mellitus without mention of complication, not stated as uncontrolled.    ROS  No chest pain, no shortness of breath, no abdominal pain       Objective:     /78   Pulse 78   Temp 36.1 °C (97 °F) (Temporal)   Resp 16   Ht 1.702 m (5' 7\")   Wt (!) 131 kg (288 lb 6.4 oz)   SpO2 96%  Body mass index is 45.17 kg/m².   Physical " Exam:  Constitutional: Alert, no distress.  Skin: Warm, dry, good turgor, maculo paular rash on distal edges of fingertips. No erythema.   Physical Exam:  Constitutional: Alert, no distress, well-groomed.  Skin: No rashes in visible areas.  Eye: Round. Conjunctiva clear, lids normal. No icterus.   ENMT: Lips pink without lesions, good dentition, moist mucous membranes. Phonation normal.  Neck: No masses, no thyromegaly. Moves freely without pain.  Respiratory: Unlabored respiratory effort, no cough or audible wheeze  Psych: Alert and oriented x3, normal affect and mood.          Assessment and Plan:   The following treatment plan was discussed    1. Rash    - Referral to Dermatology    2. Vitamin D deficiency      3. Moderate episode of recurrent major depressive disorder (HCC)    - escitalopram (LEXAPRO) 10 MG Tab; Take 1 Tablet by mouth every day.  Dispense: 90 Tablet; Refill: 3    4. Morbid obesity with BMI of 45.0-49.9, adult (HCC)    - Patient identified as having weight management issue.  Appropriate orders and counseling given.      Followup: Return in about 3 months (around 8/20/2022), or if symptoms worsen or fail to improve, for rash.

## 2022-05-20 NOTE — ASSESSMENT & PLAN NOTE
Blistering painful, fluid filled small blisters with form a callus when healing. Heals over a week to 10 days. Located on tips of fingers.   Not improved with clobetasol which she has been using for a week.  Will try rx for triamcinolone instead.   She has no other rashes except mild allergic rash with sunscreen in summer, no joint pain, diarrhea, trouble swallowing.   She has no family history of rheumatoid or connective tissue or autoimmune disorder.   No new skin or cleaning products that have triggered the rash     Advised protect from water, use cool water, use emolient liberally, frequently  Triamcinolone instead of clobetasol    If not improving, ref to derm provided.   May need to check esr, crp, other labs to further evaluate.

## 2022-05-23 ENCOUNTER — TELEMEDICINE (OUTPATIENT)
Dept: MEDICAL GROUP | Facility: PHYSICIAN GROUP | Age: 37
End: 2022-05-23
Payer: COMMERCIAL

## 2022-05-23 DIAGNOSIS — Z02.89 ENCOUNTER FOR COMPLETION OF FORM WITH PATIENT: ICD-10-CM

## 2022-05-23 DIAGNOSIS — F33.1 MODERATE EPISODE OF RECURRENT MAJOR DEPRESSIVE DISORDER (HCC): ICD-10-CM

## 2022-05-23 PROCEDURE — 99213 OFFICE O/P EST LOW 20 MIN: CPT | Mod: 95 | Performed by: NURSE PRACTITIONER

## 2022-05-23 ASSESSMENT — ANXIETY QUESTIONNAIRES
GAD7 TOTAL SCORE: 7
6. BECOMING EASILY ANNOYED OR IRRITABLE: SEVERAL DAYS
4. TROUBLE RELAXING: MORE THAN HALF THE DAYS
2. NOT BEING ABLE TO STOP OR CONTROL WORRYING: SEVERAL DAYS
7. FEELING AFRAID AS IF SOMETHING AWFUL MIGHT HAPPEN: NOT AT ALL
3. WORRYING TOO MUCH ABOUT DIFFERENT THINGS: SEVERAL DAYS
5. BEING SO RESTLESS THAT IT IS HARD TO SIT STILL: SEVERAL DAYS
1. FEELING NERVOUS, ANXIOUS, OR ON EDGE: SEVERAL DAYS

## 2022-05-23 ASSESSMENT — PATIENT HEALTH QUESTIONNAIRE - PHQ9: CLINICAL INTERPRETATION OF PHQ2 SCORE: 0

## 2022-05-23 NOTE — PROGRESS NOTES
Virtual Visit: Established Patient   This visit was conducted via Zoom using secure and encrypted videoconferencing technology.   The patient was in their home in the [facility].    The patient's identity was confirmed and verbal consent was obtained for this virtual visit.     Subjective:   CC:   Chief Complaint   Patient presents with   • Paperwork       Say Merrill is a 37 y.o. female presenting for evaluation and management of:    Moderate episode of recurrent major depressive disorder (HCC)  DONALD 7 10/14/2021 5/23/2022   DONALD-7 Total Score 12 7       Interpretation of DONALD 7 Total Score   Score Severity:  0-4 No Anxiety   5-9 Mild Anxiety  10-14 Moderate Anxiety  15-21 Severe Anxiety    Depression Screen (PHQ-2/PHQ-9) 10/14/2021 5/20/2022 5/23/2022   PHQ-2 Total Score - - -   PHQ-2 Total Score 5 0 0   PHQ-9 Total Score - - -   PHQ-9 Total Score 14 - -       Interpretation of PHQ-9 Total Score   Score Severity   1-4 No Depression   5-9 Mild Depression   10-14 Moderate Depression   15-19 Moderately Severe Depression   20-27 Severe Depression    Is a chronic moderately controlled condition.  Patient is currently taking Lexapro 10 mg tablet daily.  She reports she is tolerating this medication well without any adverse effects.  Patient is here today for [place] paperwork to be completed in order for her spouse to take time off work for acute depression anxiety symptoms.  I have filled out paperwork in the presence of the patient via telemedicine platform and answered all questions.  FMLA paperwork was scanned in to epic by MA and faxed to the employer.      ROS   Denies chest pain, shortness of breath, or fever    Current medicines (including changes today)  Current Outpatient Medications   Medication Sig Dispense Refill   • escitalopram (LEXAPRO) 10 MG Tab Take 1 Tablet by mouth every day. 90 Tablet 3   • triamcinolone acetonide (KENALOG) 0.1 % Cream Apply a thin film to affected skin twice a day. Stop for a  week if using longer than 4 weeks. 28.4 g 1   • clobetasol (TEMOVATE) 0.05 % Ointment Apply at thin film to affected skin twice a day. 15 g 1     No current facility-administered medications for this visit.       Patient Active Problem List    Diagnosis Date Noted   • Rash 05/20/2022   • Morbid obesity with BMI of 45.0-49.9, adult ([facility]) 05/20/2022   • Family history of colon cancer 11/16/2021   • Bartholin gland cyst 10/14/2021   • Well woman exam with routine gynecological exam 09/21/2021   • Vitamin D deficiency 11/27/2018   • Stress 09/19/2018   • Moderate episode of recurrent major depressive disorder (HCC) 09/19/2018   • Morbid obesity with BMI of 40.0-44.9, adult ([facility]) 09/19/2018        Objective:   There were no vitals taken for this visit.    Physical Exam:  Constitutional: Alert, no distress, well-groomed.  Skin: No rashes in visible areas.  Eye: Round. Conjunctiva clear, lids normal. No icterus.   ENMT: Lips pink without lesions, good dentition, moist mucous membranes. Phonation normal.  Neck: No masses, no thyromegaly. Moves freely without pain.  Respiratory: Unlabored respiratory effort, no cough or audible wheeze  Psych: Alert and oriented x3, normal affect and mood.     Assessment and Plan:   The following treatment plan was discussed:     1. Moderate episode of recurrent major depressive disorder (HCC)  Chronic and moderately controlled.  Patient will continue with Lexapro 10 mg daily.  Encourage patient to keep appointment with Dr. Bolivar in August.  Discussed importance of ongoing coping skills.  [facility] paperwork was filled out, scanned into epic, and faxed to employer.    2. Encounter for completion of form with patient      Follow-up: Return in about 3 months (around 8/24/2022) for With Dr. Bolivar.

## 2022-05-23 NOTE — ASSESSMENT & PLAN NOTE
DONALD 7 10/14/2021 5/23/2022   DONALD-7 Total Score 12 7       Interpretation of DONALD 7 Total Score   Score Severity:  0-4 No Anxiety   5-9 Mild Anxiety  10-14 Moderate Anxiety  15-21 Severe Anxiety    Depression Screen (PHQ-2/PHQ-9) 10/14/2021 5/20/2022 5/23/2022   PHQ-2 Total Score - - -   PHQ-2 Total Score 5 0 0   PHQ-9 Total Score - - -   PHQ-9 Total Score 14 - -       Interpretation of PHQ-9 Total Score   Score Severity   1-4 No Depression   5-9 Mild Depression   10-14 Moderate Depression   15-19 Moderately Severe Depression   20-27 Severe Depression    Is a chronic moderately controlled condition.  Patient is currently taking Lexapro 10 mg tablet daily.  She reports she is tolerating this medication well without any adverse effects.  Patient is here today for FMLA paperwork to be completed in order for her spouse to take time off work for acute depression anxiety symptoms.  I have filled out paperwork in the presence of the patient via telemedicine platform and answered all questions.  FMLA paperwork was scanned in to epic by MA and faxed to the employer.

## 2022-07-21 ENCOUNTER — OFFICE VISIT (OUTPATIENT)
Dept: URGENT CARE | Facility: PHYSICIAN GROUP | Age: 37
End: 2022-07-21
Payer: COMMERCIAL

## 2022-07-21 VITALS
HEART RATE: 82 BPM | BODY MASS INDEX: 50.02 KG/M2 | HEIGHT: 64 IN | RESPIRATION RATE: 16 BRPM | WEIGHT: 293 LBS | DIASTOLIC BLOOD PRESSURE: 80 MMHG | SYSTOLIC BLOOD PRESSURE: 118 MMHG | TEMPERATURE: 97.3 F | OXYGEN SATURATION: 96 %

## 2022-07-21 DIAGNOSIS — R59.0 ENLARGED LYMPH NODE IN NECK: ICD-10-CM

## 2022-07-21 PROCEDURE — 99213 OFFICE O/P EST LOW 20 MIN: CPT | Performed by: STUDENT IN AN ORGANIZED HEALTH CARE EDUCATION/TRAINING PROGRAM

## 2022-07-21 ASSESSMENT — FIBROSIS 4 INDEX: FIB4 SCORE: 0.35

## 2022-07-24 NOTE — PROGRESS NOTES
"Subjective:   Say Merrill is a 37 y.o. female who presents for Edema (L lymph node, x2weeks no other symptoms )      HPI:  Patient presents to the urgent care for possible swollen lymph node on the left side of her neck for 2 weeks. She reports that is was larger, but a shrunk since she first noticed it. It initially was tender to the touch, for which she used ibuprofen for, but it is no longer painful at this time. She denies any resent cold symptoms/viral infections. She denies a history of isolated enlarged lymph nodes. No history of cancer in herself or parents/siblings. She denies sore throat, difficulty swallowing, pain with swallowing, HA, dizziness, fever, chills, night sweats, weight loss, fatigue, nausea, vomiting, diarrhea, cough, ear pain, reduced ROM of the neck, neck pain, neck stiffness, injury/trauma, chest pain, or SOB       Medications:    • escitalopram Tabs    Allergies: Patient has no known allergies.    Problem List: Say Merrill does not have any pertinent problems on file.    Surgical History:  Past Surgical History:   Procedure Laterality Date   • OTHER      none reported       Past Social Hx: Say Merrill  reports that she has never smoked. She has never used smokeless tobacco. She reports current drug use. Drug: Marijuana. She reports that she does not drink alcohol.     Past Family Hx:  Say Merrill family history is not on file.     Problem list, medications, and allergies reviewed by myself today in Epic.     Objective:     /80   Pulse 82   Temp 36.3 °C (97.3 °F) (Temporal)   Resp 16   Ht 1.626 m (5' 4\")   Wt (!) 134 kg (296 lb)   SpO2 96%   BMI 50.81 kg/m²     Physical Exam  Vitals reviewed.   Constitutional:       General: She is not in acute distress.     Appearance: Normal appearance.   HENT:      Head: Normocephalic.      Right Ear: Tympanic membrane, ear canal and external ear normal.      Left Ear: Tympanic membrane, ear canal and external ear " normal.      Nose: Nose normal.      Mouth/Throat:      Mouth: Mucous membranes are moist.   Eyes:      Conjunctiva/sclera: Conjunctivae normal.      Pupils: Pupils are equal, round, and reactive to light.   Neck:      Comments: No enlarged supraclavicular nodes bilaterally. No TTP there is a solitary 10mm enlarged posterior cervical node on the left side of the neck. It is mobile. No erythema, fluctuance, or ecchymosis.  Cardiovascular:      Rate and Rhythm: Normal rate and regular rhythm.      Pulses: Normal pulses.      Heart sounds: Normal heart sounds. No murmur heard.  Pulmonary:      Effort: Pulmonary effort is normal. No respiratory distress.      Breath sounds: Normal breath sounds. No stridor. No wheezing, rhonchi or rales.   Musculoskeletal:      Cervical back: Normal range of motion. No tenderness.   Lymphadenopathy:      Cervical: Cervical adenopathy present.      Right cervical: No superficial, deep or posterior cervical adenopathy.     Left cervical: Posterior cervical adenopathy present. No superficial or deep cervical adenopathy.   Skin:     General: Skin is warm and dry.      Capillary Refill: Capillary refill takes less than 2 seconds.      Findings: No erythema, lesion or rash.   Neurological:      General: No focal deficit present.      Mental Status: She is alert and oriented to person, place, and time.         Assessment/Plan:     Diagnosis and associated orders:     1. Enlarged lymph node in neck        Comments/MDM:     • Discussed indications for further evaluation of a solitary enlarged lymph node without symptoms of infection or red flags of cancer. Vitals are WNL and she is afebrile. There are no signs of viral/bacterial infection. She does not have any red flags/symptoms suggestive of cancer. The lymph node is 10mm and nonpainful, making it a less suspicious node.  • Discussed watchful waiting and symptomatic treatment with ibuprofen if it becomes tender. Advised her to follow-up with  her PCP in 1 week for re-evaluation. She was advised to return sooner if becomes larger, painful, or she notices any other new symptoms. Patient is agreeable to the plan.  • ED/return precautions given.         Differential diagnosis, natural history, supportive care, and indications for immediate follow-up discussed.    Advised the patient to follow-up with the primary care physician for recheck, reevaluation, and consideration of further management.    Please note that this dictation was created using voice recognition software. I have made a reasonable attempt to correct obvious errors, but I expect that there are errors of grammar and possibly content that I did not discover before finalizing the note.    Electronically signed by Rashaun Blas PA-C.

## 2022-10-13 ENCOUNTER — OFFICE VISIT (OUTPATIENT)
Dept: MEDICAL GROUP | Facility: PHYSICIAN GROUP | Age: 37
End: 2022-10-13
Payer: COMMERCIAL

## 2022-10-13 VITALS
HEART RATE: 76 BPM | WEIGHT: 293 LBS | TEMPERATURE: 97.5 F | RESPIRATION RATE: 14 BRPM | HEIGHT: 67 IN | OXYGEN SATURATION: 96 % | SYSTOLIC BLOOD PRESSURE: 118 MMHG | DIASTOLIC BLOOD PRESSURE: 80 MMHG | BODY MASS INDEX: 45.99 KG/M2

## 2022-10-13 DIAGNOSIS — F33.1 MODERATE EPISODE OF RECURRENT MAJOR DEPRESSIVE DISORDER (HCC): ICD-10-CM

## 2022-10-13 DIAGNOSIS — R05.1 ACUTE COUGH: ICD-10-CM

## 2022-10-13 PROCEDURE — 99214 OFFICE O/P EST MOD 30 MIN: CPT | Performed by: FAMILY MEDICINE

## 2022-10-13 RX ORDER — VENLAFAXINE HYDROCHLORIDE 37.5 MG/1
37.5 CAPSULE, EXTENDED RELEASE ORAL DAILY
Qty: 30 CAPSULE | Refills: 3 | Status: SHIPPED | OUTPATIENT
Start: 2022-10-13 | End: 2023-03-24 | Stop reason: SINTOL

## 2022-10-13 RX ORDER — MONTELUKAST SODIUM 10 MG/1
10 TABLET ORAL DAILY
Qty: 30 TABLET | Refills: 3 | Status: SHIPPED | OUTPATIENT
Start: 2022-10-13 | End: 2023-12-22

## 2022-10-13 RX ORDER — ALBUTEROL SULFATE 90 UG/1
2 AEROSOL, METERED RESPIRATORY (INHALATION) EVERY 4 HOURS PRN
Qty: 1 EACH | Refills: 5 | Status: SHIPPED | OUTPATIENT
Start: 2022-10-13

## 2022-10-13 ASSESSMENT — PATIENT HEALTH QUESTIONNAIRE - PHQ9
SUM OF ALL RESPONSES TO PHQ QUESTIONS 1-9: 10
8. MOVING OR SPEAKING SO SLOWLY THAT OTHER PEOPLE COULD HAVE NOTICED. OR THE OPPOSITE, BEING SO FIGETY OR RESTLESS THAT YOU HAVE BEEN MOVING AROUND A LOT MORE THAN USUAL: SEVERAL DAYS
3. TROUBLE FALLING OR STAYING ASLEEP OR SLEEPING TOO MUCH: NEARLY EVERY DAY
6. FEELING BAD ABOUT YOURSELF - OR THAT YOU ARE A FAILURE OR HAVE LET YOURSELF OR YOUR FAMILY DOWN: SEVERAL DAYS
9. THOUGHTS THAT YOU WOULD BE BETTER OFF DEAD, OR OF HURTING YOURSELF: NOT AT ALL
7. TROUBLE CONCENTRATING ON THINGS, SUCH AS READING THE NEWSPAPER OR WATCHING TELEVISION: NOT AT ALL
SUM OF ALL RESPONSES TO PHQ9 QUESTIONS 1 AND 2: 2
1. LITTLE INTEREST OR PLEASURE IN DOING THINGS: SEVERAL DAYS
5. POOR APPETITE OR OVEREATING: SEVERAL DAYS
4. FEELING TIRED OR HAVING LITTLE ENERGY: MORE THAN HALF THE DAYS
2. FEELING DOWN, DEPRESSED, IRRITABLE, OR HOPELESS: SEVERAL DAYS

## 2022-10-13 ASSESSMENT — FIBROSIS 4 INDEX: FIB4 SCORE: 0.35

## 2022-10-13 NOTE — ASSESSMENT & PLAN NOTE
Since september, she has not fel benefit from taking her medication. Last week when she got sick with URI she felt back to her basline, decreased energy, mood, irrational thoughts, decreased motivation, felt stuck.   Also a lot of fatigue with the lexapro 10 mg even though she takes it at night.     She will see if things improve after URI resolve.   rx for effexor provided. Can add buspart/wellbutrin as adjunct if needed.   She has a counselor every week.

## 2022-10-13 NOTE — ASSESSMENT & PLAN NOTE
1 week of deep hoarse dry cough.   It was itching and now improving slowly and sore throat resolved.   She has been using mucinex, tylenol, ibuprofen    She notes her daughter has allergies, asthma.   She took her daughter's breathing treatment and could breath easier and was coughing up more phlegm as it loosened.       She used to have these episodes as a child mostly in the fall.     Allergy blood test ordered  rx for singulair, albuterol inhaler  Advised use flonase, generic of zyrtec/claritin/allegra

## 2022-10-13 NOTE — PROGRESS NOTES
Subjective:   Say Merrill is a 37 y.o. female here today for evaluation and management of:     Acute cough  1 week of deep hoarse dry cough.   It was itching and now improving slowly and sore throat resolved.   She has been using mucinex, tylenol, ibuprofen    She notes her daughter has allergies, asthma.   She took her daughter's breathing treatment and could breath easier and was coughing up more phlegm as it loosened.       She used to have these episodes as a child mostly in the fall.     Allergy blood test ordered  rx for singulair, albuterol inhaler  Advised use flonase, generic of zyrtec/claritin/allegra    Moderate episode of recurrent major depressive disorder (HCC)  Since september, she has not fel benefit from taking her medication. Last week when she got sick with URI she felt back to her basline, decreased energy, mood, irrational thoughts, decreased motivation, felt stuck.   Also a lot of fatigue with the lexapro 10 mg even though she takes it at night.     She will see if things improve after URI resolve.   rx for effexor provided. Can add buspart/wellbutrin as adjunct if needed.   She has a counselor every week.                Current medicines (including changes today)  Current Outpatient Medications   Medication Sig Dispense Refill    albuterol 108 (90 Base) MCG/ACT Aero Soln inhalation aerosol Inhale 2 Puffs every four hours as needed for Shortness of Breath. 1 Each 5    montelukast (SINGULAIR) 10 MG Tab Take 1 Tablet by mouth every day. 30 Tablet 3    venlafaxine XR (EFFEXOR XR) 37.5 MG CAPSULE SR 24 HR Take 1 Capsule by mouth every day. 30 Capsule 3     No current facility-administered medications for this visit.     She  has no past medical history of Allergy, unspecified not elsewhere classified, Asthma, Muscle disorder, or Type II or unspecified type diabetes mellitus without mention of complication, not stated as uncontrolled.    ROS  No chest pain, no shortness of breath, no abdominal  "pain       Objective:     /80 (BP Location: Left arm, Patient Position: Sitting, BP Cuff Size: Adult long)   Pulse 76   Temp 36.4 °C (97.5 °F) (Temporal)   Resp 14   Ht 1.689 m (5' 6.5\")   Wt (!) 133 kg (293 lb)   SpO2 96%  Body mass index is 46.58 kg/m².   Physical Exam:  Constitutional: Alert, no distress.  Skin: Warm, dry, good turgor, no rashes in visible areas.  Eye: Equal, round and reactive, conjunctiva clear, lids normal.  ENMT: Lips without lesions, good dentition, oropharynx clear.  Neck: Trachea midline, no masses, no thyromegaly. No cervical or supraclavicular lymphadenopathy  Respiratory: Unlabored respiratory effort, lungs clear to auscultation, no wheezes, no ronchi.  Cardiovascular: Normal S1, S2, no murmur, no edema.  Abdomen: Soft, non-tender, no masses, no hepatosplenomegaly.  Psych: Alert and oriented x3, normal affect and mood.        Assessment and Plan:   The following treatment plan was discussed    1. Acute cough  - Allergy Profile 1; Future    2. Moderate episode of recurrent major depressive disorder (HCC)    Other orders  - albuterol 108 (90 Base) MCG/ACT Aero Soln inhalation aerosol; Inhale 2 Puffs every four hours as needed for Shortness of Breath.  Dispense: 1 Each; Refill: 5  - montelukast (SINGULAIR) 10 MG Tab; Take 1 Tablet by mouth every day.  Dispense: 30 Tablet; Refill: 3  - venlafaxine XR (EFFEXOR XR) 37.5 MG CAPSULE SR 24 HR; Take 1 Capsule by mouth every day.  Dispense: 30 Capsule; Refill: 3      Followup: Return in about 3 months (around 1/13/2023) for depression.           "

## 2023-03-24 ENCOUNTER — OFFICE VISIT (OUTPATIENT)
Dept: MEDICAL GROUP | Facility: PHYSICIAN GROUP | Age: 38
End: 2023-03-24
Payer: COMMERCIAL

## 2023-03-24 VITALS
DIASTOLIC BLOOD PRESSURE: 80 MMHG | HEIGHT: 66 IN | SYSTOLIC BLOOD PRESSURE: 120 MMHG | BODY MASS INDEX: 47.09 KG/M2 | OXYGEN SATURATION: 94 % | TEMPERATURE: 97.1 F | RESPIRATION RATE: 16 BRPM | HEART RATE: 72 BPM | WEIGHT: 293 LBS

## 2023-03-24 DIAGNOSIS — Z82.49 FAMILY HISTORY OF HEART DISEASE IN FEMALE FAMILY MEMBER BEFORE AGE 65: ICD-10-CM

## 2023-03-24 DIAGNOSIS — Z11.59 NEED FOR HEPATITIS C SCREENING TEST: ICD-10-CM

## 2023-03-24 DIAGNOSIS — E55.9 VITAMIN D DEFICIENCY: ICD-10-CM

## 2023-03-24 DIAGNOSIS — F33.1 MODERATE EPISODE OF RECURRENT MAJOR DEPRESSIVE DISORDER (HCC): ICD-10-CM

## 2023-03-24 DIAGNOSIS — E66.01 MORBID OBESITY WITH BMI OF 45.0-49.9, ADULT (HCC): ICD-10-CM

## 2023-03-24 PROBLEM — R21 RASH: Status: RESOLVED | Noted: 2022-05-20 | Resolved: 2023-03-24

## 2023-03-24 PROBLEM — R05.1 ACUTE COUGH: Status: RESOLVED | Noted: 2022-10-13 | Resolved: 2023-03-24

## 2023-03-24 PROCEDURE — 99214 OFFICE O/P EST MOD 30 MIN: CPT | Performed by: FAMILY MEDICINE

## 2023-03-24 RX ORDER — ESCITALOPRAM OXALATE 20 MG/1
20 TABLET ORAL DAILY
Qty: 90 TABLET | Refills: 3 | Status: SHIPPED | OUTPATIENT
Start: 2023-03-24 | End: 2023-12-22

## 2023-03-24 ASSESSMENT — ANXIETY QUESTIONNAIRES
GAD7 TOTAL SCORE: 14
1. FEELING NERVOUS, ANXIOUS, OR ON EDGE: NEARLY EVERY DAY
6. BECOMING EASILY ANNOYED OR IRRITABLE: SEVERAL DAYS
2. NOT BEING ABLE TO STOP OR CONTROL WORRYING: NEARLY EVERY DAY
7. FEELING AFRAID AS IF SOMETHING AWFUL MIGHT HAPPEN: NEARLY EVERY DAY
5. BEING SO RESTLESS THAT IT IS HARD TO SIT STILL: MORE THAN HALF THE DAYS
4. TROUBLE RELAXING: SEVERAL DAYS
3. WORRYING TOO MUCH ABOUT DIFFERENT THINGS: SEVERAL DAYS

## 2023-03-24 ASSESSMENT — PATIENT HEALTH QUESTIONNAIRE - PHQ9
5. POOR APPETITE OR OVEREATING: 3 - NEARLY EVERY DAY
CLINICAL INTERPRETATION OF PHQ2 SCORE: 3
SUM OF ALL RESPONSES TO PHQ QUESTIONS 1-9: 10

## 2023-03-24 ASSESSMENT — FIBROSIS 4 INDEX: FIB4 SCORE: 0.35

## 2023-03-24 NOTE — ASSESSMENT & PLAN NOTE
Sadly pt's mother at age 62 passed away suddenly about a month ago after not feeling well  for about half a day. Pt notes there is significant family history of cardiovascular disease on mother's side.   ekg done in clinic today.   Echo ordered to evaluate pt's heart valves   Advised her she can take an asa 81 mg daily.

## 2023-03-28 NOTE — PROGRESS NOTES
"Subjective:   Say Merrill is a 37 y.o. female here today for evaluation and management of:     Family history of heart disease in female family member before age 65  Sadly pt's mother at age 62 passed away suddenly about a month ago after not feeling well  for about half a day. Pt notes there is significant family history of cardiovascular disease on mother's side.   ekg done in clinic today.   Echo ordered to evaluate pt's heart valves   Advised her she can take an asa 81 mg daily.     Moderate episode of recurrent major depressive disorder (HCC)  Chronic stable, continue on lexapro 20 mg  Has no SI/HI     EKG Interpretation   Interpreted by me   Rhythm: normal sinus   Rate: normal   Axis: normal   Ectopy: none   Conduction: normal   ST Segments: no acute change   T Waves: no acute change   Q Waves: none   Clinical Impression: no acute changes and normal EKG          Current medicines (including changes today)  Current Outpatient Medications   Medication Sig Dispense Refill    escitalopram (LEXAPRO) 20 MG tablet Take 1 Tablet by mouth every day. 90 Tablet 3    albuterol 108 (90 Base) MCG/ACT Aero Soln inhalation aerosol Inhale 2 Puffs every four hours as needed for Shortness of Breath. 1 Each 5    montelukast (SINGULAIR) 10 MG Tab Take 1 Tablet by mouth every day. 30 Tablet 3     No current facility-administered medications for this visit.     She  has no past medical history of Allergy, unspecified not elsewhere classified, Asthma, Muscle disorder, or Type II or unspecified type diabetes mellitus without mention of complication, not stated as uncontrolled.    ROS  No chest pain, no shortness of breath, no abdominal pain       Objective:     /80 (BP Location: Left arm, Patient Position: Sitting, BP Cuff Size: Adult long)   Pulse 72   Temp 36.2 °C (97.1 °F) (Temporal)   Resp 16   Ht 1.676 m (5' 6\")   Wt (!) 134 kg (296 lb)   SpO2 94%  Body mass index is 47.78 kg/m².   Physical " Exam:  Constitutional: Alert, no distress.  Skin: Warm, dry, good turgor, no rashes in visible areas.  Eye: Equal, round and reactive, conjunctiva clear, lids normal.  ENMT: Lips without lesions, good dentition, oropharynx clear.  Neck: Trachea midline, no masses, no thyromegaly. No cervical or supraclavicular lymphadenopathy  Respiratory: Unlabored respiratory effort, lungs clear to auscultation, no wheezes, no ronchi.  Cardiovascular: Normal S1, S2, no murmur, no edema.  Abdomen: Soft, non-tender, no masses, no hepatosplenomegaly.  Psych: Alert and oriented x3, normal affect and mood.        Assessment and Plan:   The following treatment plan was discussed    1. Need for hepatitis C screening test  - HCV Scrn ( [id number] 1xLife); Future    2. Family history of heart disease in female family member before age 65  - EC-ECHOCARDIOGRAM COMPLETE W/O CONT; Future  - EKG - Clinic Performed    3. Morbid obesity with BMI of 45.0-49.9, adult (HCC)  - Comp Metabolic Panel; Future  - Lipid Profile; Future    4. Vitamin D deficiency  - VITAMIN D,25 HYDROXY (DEFICIENCY); Future    5. Moderate episode of recurrent major depressive disorder (HCC)  - Patient has been identified as having a positive depression screening. Appropriate orders and counseling have been given.    Other orders  - escitalopram (LEXAPRO) 20 MG tablet; Take 1 Tablet by mouth every day.  Dispense: 90 Tablet; Refill: 3      Followup: Return in about 3 months (around 2023).

## 2023-05-23 ENCOUNTER — APPOINTMENT (OUTPATIENT)
Dept: CARDIOLOGY | Facility: MEDICAL CENTER | Age: 38
End: 2023-05-23
Attending: FAMILY MEDICINE
Payer: COMMERCIAL

## 2023-05-25 ENCOUNTER — APPOINTMENT (OUTPATIENT)
Dept: CARDIOLOGY | Facility: MEDICAL CENTER | Age: 38
End: 2023-05-25
Attending: FAMILY MEDICINE
Payer: COMMERCIAL

## 2023-06-05 ENCOUNTER — APPOINTMENT (OUTPATIENT)
Dept: CARDIOLOGY | Facility: MEDICAL CENTER | Age: 38
End: 2023-06-05
Attending: FAMILY MEDICINE
Payer: COMMERCIAL

## 2023-09-28 ENCOUNTER — APPOINTMENT (OUTPATIENT)
Dept: CARDIOLOGY | Facility: MEDICAL CENTER | Age: 38
End: 2023-09-28
Attending: FAMILY MEDICINE
Payer: COMMERCIAL

## 2023-10-04 ENCOUNTER — HOSPITAL ENCOUNTER (OUTPATIENT)
Dept: CARDIOLOGY | Facility: MEDICAL CENTER | Age: 38
End: 2023-10-04
Attending: FAMILY MEDICINE
Payer: COMMERCIAL

## 2023-10-04 DIAGNOSIS — Z82.49 FAMILY HISTORY OF HEART DISEASE IN FEMALE FAMILY MEMBER BEFORE AGE 65: ICD-10-CM

## 2023-10-04 LAB
LV EJECT FRACT  99904: 60
LV EJECT FRACT MOD 2C 99903: 64.57
LV EJECT FRACT MOD 4C 99902: 57.33
LV EJECT FRACT MOD BP 99901: 54.69

## 2023-10-04 PROCEDURE — 93306 TTE W/DOPPLER COMPLETE: CPT | Mod: 26 | Performed by: INTERNAL MEDICINE

## 2023-10-04 PROCEDURE — 93306 TTE W/DOPPLER COMPLETE: CPT

## 2023-12-12 ENCOUNTER — HOSPITAL ENCOUNTER (OUTPATIENT)
Dept: LAB | Facility: MEDICAL CENTER | Age: 38
End: 2023-12-12
Attending: FAMILY MEDICINE
Payer: COMMERCIAL

## 2023-12-12 DIAGNOSIS — Z11.59 NEED FOR HEPATITIS C SCREENING TEST: ICD-10-CM

## 2023-12-12 DIAGNOSIS — E55.9 VITAMIN D DEFICIENCY: ICD-10-CM

## 2023-12-12 LAB
25(OH)D3 SERPL-MCNC: 38 NG/ML (ref 30–100)
HCV AB SER QL: NORMAL

## 2023-12-12 PROCEDURE — 36415 COLL VENOUS BLD VENIPUNCTURE: CPT

## 2023-12-12 PROCEDURE — 82306 VITAMIN D 25 HYDROXY: CPT

## 2023-12-12 PROCEDURE — G0472 HEP C SCREEN HIGH RISK/OTHER: HCPCS

## 2023-12-21 DIAGNOSIS — T78.40XS ALLERGY, SEQUELA: ICD-10-CM

## 2023-12-22 ENCOUNTER — OFFICE VISIT (OUTPATIENT)
Dept: MEDICAL GROUP | Facility: PHYSICIAN GROUP | Age: 38
End: 2023-12-22
Payer: COMMERCIAL

## 2023-12-22 VITALS
SYSTOLIC BLOOD PRESSURE: 122 MMHG | DIASTOLIC BLOOD PRESSURE: 74 MMHG | HEART RATE: 106 BPM | BODY MASS INDEX: 44.73 KG/M2 | WEIGHT: 285 LBS | OXYGEN SATURATION: 96 % | TEMPERATURE: 97.8 F | HEIGHT: 67 IN | RESPIRATION RATE: 16 BRPM

## 2023-12-22 DIAGNOSIS — R53.83 FATIGUE, UNSPECIFIED TYPE: ICD-10-CM

## 2023-12-22 DIAGNOSIS — K59.1 FUNCTIONAL DIARRHEA: ICD-10-CM

## 2023-12-22 DIAGNOSIS — Z23 NEED FOR VACCINATION: ICD-10-CM

## 2023-12-22 DIAGNOSIS — F33.1 MODERATE EPISODE OF RECURRENT MAJOR DEPRESSIVE DISORDER (HCC): ICD-10-CM

## 2023-12-22 DIAGNOSIS — R19.7 DIARRHEA, UNSPECIFIED TYPE: ICD-10-CM

## 2023-12-22 PROCEDURE — 3074F SYST BP LT 130 MM HG: CPT | Performed by: FAMILY MEDICINE

## 2023-12-22 PROCEDURE — 90686 IIV4 VACC NO PRSV 0.5 ML IM: CPT | Performed by: FAMILY MEDICINE

## 2023-12-22 PROCEDURE — 3078F DIAST BP <80 MM HG: CPT | Performed by: FAMILY MEDICINE

## 2023-12-22 PROCEDURE — 90471 IMMUNIZATION ADMIN: CPT | Performed by: FAMILY MEDICINE

## 2023-12-22 PROCEDURE — 99214 OFFICE O/P EST MOD 30 MIN: CPT | Mod: 25 | Performed by: FAMILY MEDICINE

## 2023-12-22 RX ORDER — ESCITALOPRAM OXALATE 10 MG/1
10 TABLET ORAL DAILY
Qty: 90 TABLET | Refills: 3 | Status: SHIPPED | OUTPATIENT
Start: 2023-12-22

## 2023-12-22 ASSESSMENT — FIBROSIS 4 INDEX: FIB4 SCORE: 0.36

## 2023-12-22 NOTE — PROGRESS NOTES
"Subjective:   Say Merrill is a 38 y.o. female here today for evaluation and management of:     Moderate episode of recurrent major depressive disorder (HCC)  Doing fine on lower dose lexapro 10 mg  When tried weaning completely off had vertigo and severe fatigue.   Has no SI/HI    Functional diarrhea  Started diarrhea with urgency this year when her mother passed away  Probiotic did not help  Got worse, started OTC IBS supplement helped some.switched to mag which helped for a while. Tried fiber supplement/metamucil.   Still had on and off diarrhea not as urgent.     Will check cdiff, hpylori.                    Current medicines (including changes today)  Current Outpatient Medications   Medication Sig Dispense Refill    escitalopram (LEXAPRO) 10 MG Tab Take 1 Tablet by mouth every day. 90 Tablet 3    albuterol 108 (90 Base) MCG/ACT Aero Soln inhalation aerosol Inhale 2 Puffs every four hours as needed for Shortness of Breath. 1 Each 5     No current facility-administered medications for this visit.     She  has no past medical history of Allergy, unspecified not elsewhere classified, Asthma, Muscle disorder, or Type II or unspecified type diabetes mellitus without mention of complication, not stated as uncontrolled.    ROS  No chest pain, no shortness of breath, no abdominal pain       Objective:     /74 (BP Location: Left arm, Patient Position: Sitting, BP Cuff Size: Adult long)   Pulse (!) 106   Temp 36.6 °C (97.8 °F) (Temporal)   Resp 16   Ht 1.689 m (5' 6.5\")   Wt (!) 129 kg (285 lb)   SpO2 96%  Body mass index is 45.31 kg/m².   Physical Exam:  Constitutional: Alert, no distress.  Skin: Warm, dry, good turgor, no rashes in visible areas.  Eye: Equal, round and reactive, conjunctiva clear, lids normal.  ENMT: Lips without lesions, good dentition, oropharynx clear.  Neck: Trachea midline, no masses, no thyromegaly. No cervical or supraclavicular lymphadenopathy  Respiratory: Unlabored " respiratory effort, lungs clear to auscultation, no wheezes, no ronchi.  Cardiovascular: Normal S1, S2, no murmur, no edema.  Abdomen: Soft, non-tender, no masses, no hepatosplenomegaly.  Psych: Alert and oriented x3, normal affect and mood.        Assessment and Plan:   The following treatment plan was discussed    1. Need for vaccination  - INFLUENZA VACCINE QUAD INJ (PF)    2. Moderate episode of recurrent major depressive disorder (HCC)    3. Fatigue, unspecified type  - TSH WITH REFLEX TO FT4; Future    4. Functional diarrhea    5. Diarrhea, unspecified type  - C Diff by PCR rflx Toxin; Future  - CRYPTO/GIARDIA RAPID ASSAY; Future  - H.PYLORI STOOL ANTIGEN; Future    Other orders  - escitalopram (LEXAPRO) 10 MG Tab; Take 1 Tablet by mouth every day.  Dispense: 90 Tablet; Refill: 3      Followup: No follow-ups on file.

## 2023-12-22 NOTE — ASSESSMENT & PLAN NOTE
Started diarrhea with urgency this year when her mother passed away  Probiotic did not help  Got worse, started OTC IBS supplement helped some.switched to mag which helped for a while. Tried fiber supplement/metamucil.   Still had on and off diarrhea not as urgent.     Will check cdiff, hpylori.

## 2023-12-22 NOTE — ASSESSMENT & PLAN NOTE
Doing fine on lower dose lexapro 10 mg  When tried weaning completely off had vertigo and severe fatigue.   Has no SI/HI

## 2024-06-17 ENCOUNTER — OFFICE VISIT (OUTPATIENT)
Dept: MEDICAL GROUP | Facility: PHYSICIAN GROUP | Age: 39
End: 2024-06-17
Payer: COMMERCIAL

## 2024-06-17 VITALS
BODY MASS INDEX: 47.07 KG/M2 | DIASTOLIC BLOOD PRESSURE: 78 MMHG | OXYGEN SATURATION: 98 % | TEMPERATURE: 97.4 F | HEART RATE: 73 BPM | WEIGHT: 292.9 LBS | SYSTOLIC BLOOD PRESSURE: 122 MMHG | HEIGHT: 66 IN

## 2024-06-17 DIAGNOSIS — J01.40 ACUTE NON-RECURRENT PANSINUSITIS: ICD-10-CM

## 2024-06-17 DIAGNOSIS — J02.9 SORE THROAT: ICD-10-CM

## 2024-06-17 DIAGNOSIS — H66.001 NON-RECURRENT ACUTE SUPPURATIVE OTITIS MEDIA OF RIGHT EAR WITHOUT SPONTANEOUS RUPTURE OF TYMPANIC MEMBRANE: ICD-10-CM

## 2024-06-17 PROBLEM — Z01.419 WELL WOMAN EXAM WITH ROUTINE GYNECOLOGICAL EXAM: Status: RESOLVED | Noted: 2021-09-21 | Resolved: 2024-06-17

## 2024-06-17 LAB — S PYO DNA SPEC NAA+PROBE: NOT DETECTED

## 2024-06-17 PROCEDURE — 3074F SYST BP LT 130 MM HG: CPT | Performed by: NURSE PRACTITIONER

## 2024-06-17 PROCEDURE — 3078F DIAST BP <80 MM HG: CPT | Performed by: NURSE PRACTITIONER

## 2024-06-17 PROCEDURE — 87651 STREP A DNA AMP PROBE: CPT | Performed by: NURSE PRACTITIONER

## 2024-06-17 PROCEDURE — 99214 OFFICE O/P EST MOD 30 MIN: CPT | Performed by: NURSE PRACTITIONER

## 2024-06-17 RX ORDER — AMOXICILLIN AND CLAVULANATE POTASSIUM 875; 125 MG/1; MG/1
1 TABLET, FILM COATED ORAL 2 TIMES DAILY
Qty: 14 TABLET | Refills: 0 | Status: SHIPPED | OUTPATIENT
Start: 2024-06-17 | End: 2024-06-24

## 2024-06-17 RX ORDER — LIDOCAINE HYDROCHLORIDE 20 MG/ML
5-15 SOLUTION OROPHARYNGEAL
Qty: 200 ML | Refills: 0 | Status: SHIPPED | OUTPATIENT
Start: 2024-06-17

## 2024-06-17 ASSESSMENT — ENCOUNTER SYMPTOMS
HEMATOLOGIC/LYMPHATIC NEGATIVE: 1
ALLERGIC/IMMUNOLOGIC NEGATIVE: 1
FEVER: 1
COUGH: 1
PSYCHIATRIC NEGATIVE: 1
EYES NEGATIVE: 1
CHILLS: 1
CARDIOVASCULAR NEGATIVE: 1
GASTROINTESTINAL NEGATIVE: 1
TROUBLE SWALLOWING: 1
HEADACHES: 1
SORE THROAT: 1
ENDOCRINE NEGATIVE: 1
MUSCULOSKELETAL NEGATIVE: 1

## 2024-06-17 ASSESSMENT — PATIENT HEALTH QUESTIONNAIRE - PHQ9
3. TROUBLE FALLING OR STAYING ASLEEP OR SLEEPING TOO MUCH: NOT AT ALL
5. POOR APPETITE OR OVEREATING: NOT AT ALL
2. FEELING DOWN, DEPRESSED, IRRITABLE, OR HOPELESS: NOT AT ALL
SUM OF ALL RESPONSES TO PHQ QUESTIONS 1-9: 0
1. LITTLE INTEREST OR PLEASURE IN DOING THINGS: NOT AT ALL
SUM OF ALL RESPONSES TO PHQ9 QUESTIONS 1 AND 2: 0
9. THOUGHTS THAT YOU WOULD BE BETTER OFF DEAD, OR OF HURTING YOURSELF: NOT AT ALL
4. FEELING TIRED OR HAVING LITTLE ENERGY: NOT AT ALL
6. FEELING BAD ABOUT YOURSELF - OR THAT YOU ARE A FAILURE OR HAVE LET YOURSELF OR YOUR FAMILY DOWN: NOT AL ALL
8. MOVING OR SPEAKING SO SLOWLY THAT OTHER PEOPLE COULD HAVE NOTICED. OR THE OPPOSITE, BEING SO FIGETY OR RESTLESS THAT YOU HAVE BEEN MOVING AROUND A LOT MORE THAN USUAL: NOT AT ALL
7. TROUBLE CONCENTRATING ON THINGS, SUCH AS READING THE NEWSPAPER OR WATCHING TELEVISION: NOT AT ALL

## 2024-06-17 NOTE — PROGRESS NOTES
"Verbal consent was acquired by the patient to use [employer] ambient listening note generation during this visit Yes      Subjective   Say Merrill is a 39 y.o. female who presents for possible strep throat.  History of Present Illness  The patient presents for evaluation of sore throat and some white patches in her throat.    The patient has been experiencing a sore throat accompanied by white patches in her throat for a duration of 3 days. Concurrently, she has been experiencing a headache. She experienced fevers and chills on Saturday and Sunday, but did not measure her temperature. She reports no known sick contacts. She has attempted to manage the pain with over-the-counter Tylenol and ibuprofen. Despite a slight reduction in inflammation, the pain persists. She denies any difficulty in swallowing, but reports significant discomfort during swallowing. She also reports mild right-sided ear pain. She experienced a cough this morning while showering, but otherwise, she denies any shortness of breath. She has a history of strep throat, and her current symptoms are reminiscent of her previous strep throat infections.    Review of Systems   Constitutional:  Positive for chills and fever.   HENT:  Positive for ear pain, sore throat and trouble swallowing.    Eyes: Negative.    Respiratory:  Positive for cough.    Cardiovascular: Negative.    Gastrointestinal: Negative.    Endocrine: Negative.    Genitourinary: Negative.    Musculoskeletal: Negative.    Skin: Negative.    Allergic/Immunologic: Negative.    Neurological:  Positive for headaches.   Hematological: Negative.    Psychiatric/Behavioral: Negative.       Objective   /78 (BP Location: Right arm, Patient Position: Sitting, BP Cuff Size: Adult)   Pulse 73   Temp 36.3 °C (97.4 °F) (Temporal)   Ht 1.676 m (5' 6\")   Wt (!) 133 kg (292 lb 14.4 oz)   SpO2 98%   Physical Exam  General: Normal appearing. No distress.  HEENT: Right purulent effusion " filling the middle ear, right bulging TM. Right TM dull with rim of erythema.  Normocephalic. Eyes conjunctiva clear lids without ptosis, pupils equal and reactive to light accommodation, ears normal shape and contour, canals are clear bilaterally, left tympanic membrane is benign, nasal mucosa benign, oropharynx is with erythema, without edema or exudates. Sinuses (frontal and maxillary) tender to palpation.  Pulmonary: Clear to ausculation.  Normal effort. No rales, rhonchi, or wheezing.  Cardiovascular: Regular rate and rhythm without murmur. Carotid and radial pulses are intact and equal bilaterally.  Neurologic: Grossly nonfocal.  Lymph: No cervical, supraclavicular or axillary lymph nodes are palpable.  Skin: Warm and dry.  No obvious lesions.  Musculoskeletal: Normal gait. No extremity cyanosis, clubbing, or edema.  Psych: Normal mood and affect. Alert and oriented x3. Judgment and insight is normal.     Results  - POCT CEPHEID GROUP A STREP - PCR - NEGATIVE in clinic    Assessment & Plan  1. Acute non-recurrent pansinusitis  2. Non-recurrent acute suppurative otitis media of right ear without spontaneous rupture of tympanic membrane  3. Sore throat  New to examiner and patient with symptoms starting 3 days ago. Strep negative in clinic. Start Augmentin 875-125 mg twice daily for 7 days, complete entire course of antibiotics even if symptoms improve, take with food to prevent stomach upset. May use lidocaine viscous solution every 3 hours as needed for sore throat.  Symptomatic and supportive care:   Plenty of oral hydration and rest   Over the counter cough suppressant as directed.  Tylenol or ibuprofen for pain and fever as directed.   Warm salt water gargles for sore throat, soft foods, cool liquids.   Saline nasal spray and Flonase as a decongestant.   Infection control measures at home. Stay away from people, Hand washing, covering sneeze/cough, disinfect surfaces.   Overall, the patient is  well-appearing. They are not hypoxic, afebrile, and a normal pulmonary exam.   - POCT CEPHEID GROUP A STREP - PCR  - amoxicillin-clavulanate (AUGMENTIN) 875-125 MG Tab; Take 1 Tablet by mouth 2 times a day for 7 days.  Dispense: 14 Tablet; Refill: 0  - lidocaine (XYLOCAINE) 2 % Solution; Take 5-15 mL by mouth every 3 hours as needed for Throat/Mouth Pain.  Dispense: 200 mL; Refill: 0     Return if symptoms worsen or fail to improve.     I have placed the below orders and discussed them with an approved delegating provider. The MA is performing the below orders under the direction of Dr. Tang.      Please note that this dictation was created using voice recognition software. I have made every reasonable attempt to correct obvious errors, but I expect that there are errors of grammar and possibly content that I did not discover before finalizing the note.

## 2024-11-05 ENCOUNTER — APPOINTMENT (OUTPATIENT)
Dept: MEDICAL GROUP | Facility: PHYSICIAN GROUP | Age: 39
End: 2024-11-05
Payer: COMMERCIAL

## 2024-11-05 VITALS
RESPIRATION RATE: 16 BRPM | BODY MASS INDEX: 45.99 KG/M2 | DIASTOLIC BLOOD PRESSURE: 82 MMHG | WEIGHT: 293 LBS | OXYGEN SATURATION: 97 % | HEART RATE: 97 BPM | TEMPERATURE: 97.7 F | SYSTOLIC BLOOD PRESSURE: 122 MMHG | HEIGHT: 67 IN

## 2024-11-05 DIAGNOSIS — Z00.00 ANNUAL PHYSICAL EXAM: ICD-10-CM

## 2024-11-05 DIAGNOSIS — D50.9 IRON DEFICIENCY ANEMIA, UNSPECIFIED IRON DEFICIENCY ANEMIA TYPE: ICD-10-CM

## 2024-11-05 DIAGNOSIS — Z13.220 SCREENING, LIPID: ICD-10-CM

## 2024-11-05 DIAGNOSIS — Z23 NEED FOR VACCINATION: ICD-10-CM

## 2024-11-05 DIAGNOSIS — R53.82 CHRONIC FATIGUE: ICD-10-CM

## 2024-11-05 PROBLEM — N75.0 BARTHOLIN GLAND CYST: Status: RESOLVED | Noted: 2021-10-14 | Resolved: 2024-11-05

## 2024-11-05 PROCEDURE — 99395 PREV VISIT EST AGE 18-39: CPT | Performed by: FAMILY MEDICINE

## 2024-11-05 PROCEDURE — 3079F DIAST BP 80-89 MM HG: CPT | Performed by: FAMILY MEDICINE

## 2024-11-05 PROCEDURE — 3074F SYST BP LT 130 MM HG: CPT | Performed by: FAMILY MEDICINE

## 2024-11-05 RX ORDER — HYDROXYZINE HYDROCHLORIDE 25 MG/1
25 TABLET, FILM COATED ORAL
Qty: 30 TABLET | Refills: 2 | Status: SHIPPED | OUTPATIENT
Start: 2024-11-05

## 2024-11-05 NOTE — ASSESSMENT & PLAN NOTE
39F presents for annual exam.   Reviewed prior labs, advised on immunizations, age appropriate screening discussed.   Continue healthy diet low in sugars and carbohydrates, continue regular exercise.   Mental health with no crises of anxiety or depression.   She does not chew or smoke tobacco.   She does not drink alcohol.   Cervical ca screening done in 2021 with normal pap and hpv.

## 2024-11-06 NOTE — PROGRESS NOTES
"Subjective:   Say Merrill is a 39 y.o. female here today for evaluation and management of:     Annual physical exam  39F presents for annual exam.   Reviewed prior labs, advised on immunizations, age appropriate screening discussed.   Continue healthy diet low in sugars and carbohydrates, continue regular exercise.   Mental health with no crises of anxiety or depression.   She does not chew or smoke tobacco.   She does not drink alcohol.   Cervical ca screening done in 2021 with normal pap and hpv.          Current medicines (including changes today)  Current Outpatient Medications   Medication Sig Dispense Refill    hydrOXYzine HCl (ATARAX) 25 MG Tab Take 1 Tablet by mouth 1 time a day as needed for Anxiety. 30 Tablet 2     No current facility-administered medications for this visit.     She  has no past medical history of Allergy, unspecified not elsewhere classified, Asthma, Muscle disorder, or Type II or unspecified type diabetes mellitus without mention of complication, not stated as uncontrolled.    ROS  No chest pain, no shortness of breath, no abdominal pain       Objective:     /82 (BP Location: Right arm, Patient Position: Sitting, BP Cuff Size: Large adult)   Pulse 97   Temp 36.5 °C (97.7 °F) (Temporal)   Resp 16   Ht 1.702 m (5' 7\")   Wt (!) 134 kg (296 lb)   SpO2 97%  Body mass index is 46.36 kg/m².   Physical Exam:  Constitutional: Alert, no distress.  Skin: Warm, dry, good turgor, no rashes in visible areas.  Eye: Equal, round and reactive, conjunctiva clear, lids normal.  ENMT: Lips without lesions, good dentition, oropharynx clear.  Neck: Trachea midline, no masses, no thyromegaly. No cervical or supraclavicular lymphadenopathy  Respiratory: Unlabored respiratory effort, lungs clear to auscultation, no wheezes, no ronchi.  Cardiovascular: Normal S1, S2, no murmur, no edema.  Abdomen: Soft, non-tender, no masses, no hepatosplenomegaly.  Psych: Alert and oriented x3, normal affect " and mood.    Assessment and Plan:   The following treatment plan was discussed    1. Annual physical exam    2. Need for vaccination    3. Iron deficiency anemia, unspecified iron deficiency anemia type  - CBC WITHOUT DIFFERENTIAL; Future  - IRON/TOTAL IRON BIND; Future    4. Chronic fatigue  - Comp Metabolic Panel; Future  - TSH WITH REFLEX TO FT4; Future    5. Screening, lipid  - Lipid Profile; Future    Other orders  - hydrOXYzine HCl (ATARAX) 25 MG Tab; Take 1 Tablet by mouth 1 time a day as needed for Anxiety.  Dispense: 30 Tablet; Refill: 2      Followup: Return in about 3 months (around 2/5/2025) for Lab Review.

## 2025-07-30 ENCOUNTER — APPOINTMENT (OUTPATIENT)
Dept: MEDICAL GROUP | Facility: PHYSICIAN GROUP | Age: 40
End: 2025-07-30
Payer: COMMERCIAL

## 2025-07-30 ENCOUNTER — OFFICE VISIT (OUTPATIENT)
Dept: URGENT CARE | Facility: PHYSICIAN GROUP | Age: 40
End: 2025-07-30
Payer: COMMERCIAL

## 2025-07-30 VITALS
HEART RATE: 81 BPM | BODY MASS INDEX: 47.09 KG/M2 | TEMPERATURE: 97.3 F | SYSTOLIC BLOOD PRESSURE: 136 MMHG | DIASTOLIC BLOOD PRESSURE: 88 MMHG | WEIGHT: 293 LBS | OXYGEN SATURATION: 99 % | RESPIRATION RATE: 16 BRPM | HEIGHT: 66 IN

## 2025-07-30 DIAGNOSIS — F32.A DEPRESSION, UNSPECIFIED DEPRESSION TYPE: Primary | ICD-10-CM

## 2025-07-31 ENCOUNTER — TELEPHONE (OUTPATIENT)
Dept: URGENT CARE | Facility: PHYSICIAN GROUP | Age: 40
End: 2025-07-31
Payer: COMMERCIAL

## 2025-07-31 NOTE — PATIENT INSTRUCTIONS
PLEASE PROCEED DIRECTLY TO [facility]    ALL AGES:   [facility]  [phone] [id number]  [place] [address]    [facility] CRISIS LINE [phone]    Call or text [phone] or text TALK to [phone] to reach [facility]

## 2025-07-31 NOTE — PROGRESS NOTES
"Patient/parent/guardian has consented to treatment and for use of patient information for treatment and billing purposes.  Subjective:   Say Merrill  is a 40 y.o. female who presents for Depression (Depression, Suicidal thoughts)       Here for depression. Hx of depression with medication use (Lexapro) from [id number]. Over the last few months began becoming depressed and over the last 2 weeks significant increase in depression. Positive thoughts suicide. No plan. Feels \"like everything was better if I wasn't here\". No previous hospitalizations or attempts.  has weapons locked in safe. Unable to see primary care. Had appt with MD and stuck in traffic, late to appt.  aware of her feelings and is supportive.         ROS      CURRENT MEDICATIONS:  hydrOXYzine HCl Tabs  Allergies:   Allergies[1]  Current Problems: Say Merrill does not have any pertinent problems on file.  Past Surgical Hx:    Past Surgical History:   Procedure Laterality Date    OTHER      none reported      Past Social Hx:  reports that she has never smoked. She has never used smokeless tobacco. She reports that she does not currently use drugs after having used the following drugs: Marijuana. She reports that she does not drink alcohol.    Objective:   /88   Pulse 81   Temp 36.3 °C (97.3 °F)   Resp 16   Ht 1.676 m (5' 6\")   Wt (!) 136 kg (300 lb)   SpO2 99%   BMI 48.42 kg/m²   Physical Exam  Vitals and nursing note reviewed.   Constitutional:       General: She is not in acute distress.     Appearance: Normal appearance. She is not ill-appearing.   HENT:      Head: Normocephalic and atraumatic.      Right Ear: External ear normal.      Left Ear: External ear normal.      Nose: Nose normal.   Eyes:      Extraocular Movements: Extraocular movements intact.      Conjunctiva/sclera: Conjunctivae normal.      Pupils: Pupils are equal, round, and reactive to light.   Cardiovascular:      Rate and Rhythm: Normal rate. "   Pulmonary:      Effort: Pulmonary effort is normal.   Musculoskeletal:         General: Normal range of motion.      Cervical back: Normal range of motion.   Skin:     General: Skin is warm and dry.   Neurological:      General: No focal deficit present.      Mental Status: She is alert.       Results for orders placed or performed in visit on 06/17/24   POCT CEPHEID GROUP A STREP - PCR    Collection Time: 06/17/24 10:34 AM   Result Value Ref Range    POC Group A Strep, PCR Not Detected Not Detected, Invalid     No results found.  Assessment/Plan:   1. Depression, unspecified depression type  40-year-old female presents with concern for increasing depression.  Vital signs stable, afebrile.  In no acute distress.  Mildly flat affect, good eye contact.  Provides adequate history.  She appears more apathetic and depressed with no specific plan for suicide.  No history of suicide attempt, or hospitalization.  She is able to contract for safety today.  I do feel she should have a complete evaluation with psychiatric care.  Recommend consultation at [facility], immediately following her urgent care visit.  She is agreeable to this, I have provided the address of the facility.  I have also called [facility] and given them a verbal referral.    Shared decision-making process utilized.  Clear instructions provided.  Please note that this dictation was created using voice recognition software. I have made every reasonable attempt to correct obvious errors,  but there may be grammar errors, and possibly content that I did not discover before finalizing the note.   This note was electronically signed by SHARON Mariee              [1]   Allergies  Allergen Reactions    Nickel Rash

## 2025-07-31 NOTE — TELEPHONE ENCOUNTER
Message left for patient to return my call.  I called [facility] this morning they did not have record of her arrival yesterday.

## 2025-07-31 NOTE — TELEPHONE ENCOUNTER
Patient answered phone, she had discussed situation with her  who would be with her this weekend.  She is establishing with a new primary care.  She is considering consulting [facility] tomorrow with her sister.

## 2025-08-15 ENCOUNTER — OFFICE VISIT (OUTPATIENT)
Dept: MEDICAL GROUP | Facility: PHYSICIAN GROUP | Age: 40
End: 2025-08-15
Payer: COMMERCIAL

## 2025-08-15 VITALS
SYSTOLIC BLOOD PRESSURE: 130 MMHG | DIASTOLIC BLOOD PRESSURE: 80 MMHG | HEART RATE: 69 BPM | WEIGHT: 293 LBS | BODY MASS INDEX: 47.09 KG/M2 | RESPIRATION RATE: 19 BRPM | TEMPERATURE: 98.4 F | HEIGHT: 66 IN | OXYGEN SATURATION: 96 %

## 2025-08-15 DIAGNOSIS — F33.1 MODERATE EPISODE OF RECURRENT MAJOR DEPRESSIVE DISORDER (HCC): ICD-10-CM

## 2025-08-15 DIAGNOSIS — E55.9 VITAMIN D DEFICIENCY: Primary | ICD-10-CM

## 2025-08-15 PROCEDURE — 3075F SYST BP GE 130 - 139MM HG: CPT | Performed by: FAMILY MEDICINE

## 2025-08-15 PROCEDURE — 99214 OFFICE O/P EST MOD 30 MIN: CPT | Performed by: FAMILY MEDICINE

## 2025-08-15 PROCEDURE — 3079F DIAST BP 80-89 MM HG: CPT | Performed by: FAMILY MEDICINE

## 2025-08-15 RX ORDER — ESCITALOPRAM OXALATE 10 MG/1
10 TABLET ORAL DAILY
Qty: 90 TABLET | Refills: 3 | Status: SHIPPED | OUTPATIENT
Start: 2025-08-15

## 2025-08-15 ASSESSMENT — ANXIETY QUESTIONNAIRES
3. WORRYING TOO MUCH ABOUT DIFFERENT THINGS: SEVERAL DAYS
GAD7 TOTAL SCORE: 11
4. TROUBLE RELAXING: SEVERAL DAYS
IF YOU CHECKED OFF ANY PROBLEMS ON THIS QUESTIONNAIRE, HOW DIFFICULT HAVE THESE PROBLEMS MADE IT FOR YOU TO DO YOUR WORK, TAKE CARE OF THINGS AT HOME, OR GET ALONG WITH OTHER PEOPLE: SOMEWHAT DIFFICULT
1. FEELING NERVOUS, ANXIOUS, OR ON EDGE: MORE THAN HALF THE DAYS
6. BECOMING EASILY ANNOYED OR IRRITABLE: MORE THAN HALF THE DAYS
2. NOT BEING ABLE TO STOP OR CONTROL WORRYING: SEVERAL DAYS
5. BEING SO RESTLESS THAT IT IS HARD TO SIT STILL: MORE THAN HALF THE DAYS
7. FEELING AFRAID AS IF SOMETHING AWFUL MIGHT HAPPEN: MORE THAN HALF THE DAYS

## 2025-08-15 ASSESSMENT — PATIENT HEALTH QUESTIONNAIRE - PHQ9
5. POOR APPETITE OR OVEREATING: 2 - MORE THAN HALF THE DAYS
SUM OF ALL RESPONSES TO PHQ QUESTIONS 1-9: 16
CLINICAL INTERPRETATION OF PHQ2 SCORE: 5

## 2025-09-03 ENCOUNTER — APPOINTMENT (OUTPATIENT)
Dept: MEDICAL GROUP | Facility: PHYSICIAN GROUP | Age: 40
End: 2025-09-03
Payer: COMMERCIAL